# Patient Record
Sex: FEMALE | Race: BLACK OR AFRICAN AMERICAN | Employment: PART TIME | URBAN - METROPOLITAN AREA
[De-identification: names, ages, dates, MRNs, and addresses within clinical notes are randomized per-mention and may not be internally consistent; named-entity substitution may affect disease eponyms.]

---

## 2017-12-18 ENCOUNTER — HOSPITAL ENCOUNTER (EMERGENCY)
Facility: CLINIC | Age: 22
Discharge: HOME | End: 2017-12-18
Attending: EMERGENCY MEDICINE | Admitting: EMERGENCY MEDICINE

## 2017-12-18 ENCOUNTER — APPOINTMENT (OUTPATIENT)
Dept: ULTRASOUND IMAGING | Facility: CLINIC | Age: 22
End: 2017-12-18
Attending: PHYSICIAN ASSISTANT

## 2017-12-18 VITALS
WEIGHT: 150 LBS | TEMPERATURE: 98.2 F | RESPIRATION RATE: 20 BRPM | OXYGEN SATURATION: 100 % | HEIGHT: 64 IN | DIASTOLIC BLOOD PRESSURE: 61 MMHG | BODY MASS INDEX: 25.61 KG/M2 | SYSTOLIC BLOOD PRESSURE: 97 MMHG

## 2017-12-18 DIAGNOSIS — M54.50 ACUTE BILATERAL LOW BACK PAIN WITHOUT SCIATICA: ICD-10-CM

## 2017-12-18 DIAGNOSIS — R10.2 PELVIC PAIN IN FEMALE: ICD-10-CM

## 2017-12-18 LAB
ALBUMIN SERPL-MCNC: 3.4 G/DL (ref 3.4–5)
ALBUMIN UR-MCNC: NEGATIVE MG/DL
ALP SERPL-CCNC: 81 U/L (ref 40–150)
ALT SERPL W P-5'-P-CCNC: 14 U/L (ref 0–50)
ANION GAP SERPL CALCULATED.3IONS-SCNC: 9 MMOL/L (ref 3–14)
APPEARANCE UR: ABNORMAL
AST SERPL W P-5'-P-CCNC: 11 U/L (ref 0–45)
BACTERIA #/AREA URNS HPF: ABNORMAL /HPF
BASOPHILS # BLD AUTO: 0 10E9/L (ref 0–0.2)
BASOPHILS NFR BLD AUTO: 0.5 %
BILIRUB SERPL-MCNC: 0.3 MG/DL (ref 0.2–1.3)
BILIRUB UR QL STRIP: NEGATIVE
BUN SERPL-MCNC: 7 MG/DL (ref 7–30)
CALCIUM SERPL-MCNC: 8.8 MG/DL (ref 8.5–10.1)
CHLORIDE SERPL-SCNC: 106 MMOL/L (ref 94–109)
CO2 SERPL-SCNC: 22 MMOL/L (ref 20–32)
COLOR UR AUTO: YELLOW
CREAT SERPL-MCNC: 0.51 MG/DL (ref 0.52–1.04)
DIFFERENTIAL METHOD BLD: ABNORMAL
EOSINOPHIL # BLD AUTO: 0.2 10E9/L (ref 0–0.7)
EOSINOPHIL NFR BLD AUTO: 3 %
ERYTHROCYTE [DISTWIDTH] IN BLOOD BY AUTOMATED COUNT: 13.9 % (ref 10–15)
GFR SERPL CREATININE-BSD FRML MDRD: >90 ML/MIN/1.7M2
GLUCOSE SERPL-MCNC: 81 MG/DL (ref 70–99)
GLUCOSE UR STRIP-MCNC: NEGATIVE MG/DL
HCG UR QL: NEGATIVE
HCT VFR BLD AUTO: 36.3 % (ref 35–47)
HGB BLD-MCNC: 11.6 G/DL (ref 11.7–15.7)
HGB UR QL STRIP: ABNORMAL
IMM GRANULOCYTES # BLD: 0 10E9/L (ref 0–0.4)
IMM GRANULOCYTES NFR BLD: 0.2 %
KETONES UR STRIP-MCNC: NEGATIVE MG/DL
LEUKOCYTE ESTERASE UR QL STRIP: ABNORMAL
LYMPHOCYTES # BLD AUTO: 2.2 10E9/L (ref 0.8–5.3)
LYMPHOCYTES NFR BLD AUTO: 33 %
MCH RBC QN AUTO: 25.9 PG (ref 26.5–33)
MCHC RBC AUTO-ENTMCNC: 32 G/DL (ref 31.5–36.5)
MCV RBC AUTO: 81 FL (ref 78–100)
MONOCYTES # BLD AUTO: 0.8 10E9/L (ref 0–1.3)
MONOCYTES NFR BLD AUTO: 12.6 %
MUCOUS THREADS #/AREA URNS LPF: PRESENT /LPF
NEUTROPHILS # BLD AUTO: 3.3 10E9/L (ref 1.6–8.3)
NEUTROPHILS NFR BLD AUTO: 50.7 %
NITRATE UR QL: NEGATIVE
NRBC # BLD AUTO: 0 10*3/UL
NRBC BLD AUTO-RTO: 0 /100
PH UR STRIP: 5 PH (ref 5–7)
PLATELET # BLD AUTO: 295 10E9/L (ref 150–450)
POTASSIUM SERPL-SCNC: 3.5 MMOL/L (ref 3.4–5.3)
PROT SERPL-MCNC: 8.2 G/DL (ref 6.8–8.8)
RBC # BLD AUTO: 4.48 10E12/L (ref 3.8–5.2)
RBC #/AREA URNS AUTO: 6 /HPF (ref 0–2)
SODIUM SERPL-SCNC: 137 MMOL/L (ref 133–144)
SOURCE: ABNORMAL
SP GR UR STRIP: 1.02 (ref 1–1.03)
SPECIMEN SOURCE: NORMAL
SPERM #/AREA URNS HPF: PRESENT /HPF
SQUAMOUS #/AREA URNS AUTO: 26 /HPF (ref 0–1)
UROBILINOGEN UR STRIP-MCNC: 0 MG/DL (ref 0–2)
WBC # BLD AUTO: 6.6 10E9/L (ref 4–11)
WBC #/AREA URNS AUTO: 23 /HPF (ref 0–2)
WET PREP SPEC: NORMAL

## 2017-12-18 PROCEDURE — 87591 N.GONORRHOEAE DNA AMP PROB: CPT | Performed by: PHYSICIAN ASSISTANT

## 2017-12-18 PROCEDURE — 99285 EMERGENCY DEPT VISIT HI MDM: CPT | Mod: 25

## 2017-12-18 PROCEDURE — 87086 URINE CULTURE/COLONY COUNT: CPT | Performed by: EMERGENCY MEDICINE

## 2017-12-18 PROCEDURE — 96374 THER/PROPH/DIAG INJ IV PUSH: CPT

## 2017-12-18 PROCEDURE — 81001 URINALYSIS AUTO W/SCOPE: CPT | Performed by: EMERGENCY MEDICINE

## 2017-12-18 PROCEDURE — 87210 SMEAR WET MOUNT SALINE/INK: CPT | Performed by: PHYSICIAN ASSISTANT

## 2017-12-18 PROCEDURE — 25000128 H RX IP 250 OP 636: Performed by: PHYSICIAN ASSISTANT

## 2017-12-18 PROCEDURE — 80053 COMPREHEN METABOLIC PANEL: CPT | Performed by: EMERGENCY MEDICINE

## 2017-12-18 PROCEDURE — 93976 VASCULAR STUDY: CPT | Mod: XS

## 2017-12-18 PROCEDURE — 87491 CHLMYD TRACH DNA AMP PROBE: CPT | Performed by: PHYSICIAN ASSISTANT

## 2017-12-18 PROCEDURE — 85025 COMPLETE CBC W/AUTO DIFF WBC: CPT | Performed by: EMERGENCY MEDICINE

## 2017-12-18 PROCEDURE — 81025 URINE PREGNANCY TEST: CPT | Performed by: EMERGENCY MEDICINE

## 2017-12-18 RX ORDER — CYCLOBENZAPRINE HCL 5 MG
5 TABLET ORAL 3 TIMES DAILY PRN
Qty: 15 TABLET | Refills: 0 | Status: SHIPPED | OUTPATIENT
Start: 2017-12-18 | End: 2018-01-29

## 2017-12-18 RX ORDER — IBUPROFEN 600 MG/1
600 TABLET, FILM COATED ORAL EVERY 6 HOURS PRN
Qty: 30 TABLET | Refills: 0 | Status: SHIPPED | OUTPATIENT
Start: 2017-12-18 | End: 2018-01-29

## 2017-12-18 RX ORDER — KETOROLAC TROMETHAMINE 30 MG/ML
30 INJECTION, SOLUTION INTRAMUSCULAR; INTRAVENOUS ONCE
Status: COMPLETED | OUTPATIENT
Start: 2017-12-18 | End: 2017-12-18

## 2017-12-18 RX ADMIN — KETOROLAC TROMETHAMINE 30 MG: 30 INJECTION, SOLUTION INTRAMUSCULAR at 18:51

## 2017-12-18 ASSESSMENT — ENCOUNTER SYMPTOMS
ABDOMINAL PAIN: 1
VOMITING: 1
FEVER: 1
BACK PAIN: 1

## 2017-12-18 NOTE — ED PROVIDER NOTES
"  History     Chief Complaint:  Abdominal Pain and Back Pain      HPI   History is provided by patient's partner.     Yoana Henley is a 32 year old female who presents with back and abdominal pain. Patient reports that she has been experiencing back pain for about a month with intermittent lower abdominal pain. According to the partner, patient complains of the pain mostly felt during sex. She has also been experiencing episodes of vomiting and \"feeling warm\" at night. Patient went to Urgent care for evaluation of these symptoms and was referred here after her urine returned with traces of blood in it. She denies any vaginal discharge or itching. She is not currently menstruating. She reports that ibuprofen helps a little. The patient has no prior history of kidney stones.     Allergies:  No known drug allergies.     Medications:    The patient is currently on no regular medications.     Past Medical History:    History reviewed.  No significant past medical history.      Past Surgical History:    History reviewed. No pertinent past surgical history.     Family History:    History reviewed. No pertinent family history.     Social History:  Marital Status:  Single   Smoking status: No   Alcohol use: No      Review of Systems   Constitutional: Positive for fever.   Gastrointestinal: Positive for abdominal pain and vomiting.   Genitourinary: Negative for vaginal discharge.   Musculoskeletal: Positive for back pain.   All other systems reviewed and are negative.    Physical Exam   First Vitals:  BP: 122/72  Heart Rate: 74  Temp: 98.2  F (36.8  C)  Resp: 20  Height: 162.6 cm (5' 4\")  Weight: 68 kg (150 lb)  SpO2: 99 %      Physical Exam  Nursing note and vitals reviewed.     GENERAL: Alert, mild distress, non toxic appearing.   HEENT: Normal conjunctiva. No scleral icterus. MMM.   NECK: Supple.  CARDIAC: Normal rate and regular rhythm. Normal heart sounds. No murmurs, rubs, or gallops appreciated. Intact distal pedal " pulses 2+.   PULMONARY: CTA bilaterally. Normal breath sounds. No wheezing, crackles, or rhonchi appreciated.  ABDOMEN: Soft, non distended abdomen. Non-tender. No rebound or guarding.   : Normal external genitalia. Scant clear discharge in vaginal vault. No blood. No CMT. No bilateral adnexal tenderness bilaterally.   NEURO: Alert and oriented. Non-focal. Sensation intact to light touch distally in bilateral lower extremities. Normal and symmetric strength of bilateral lower extremities. Active DF/PF intact.   MUSCULOSKELETAL: Normal range of motion. No peripheral edema. No CVA tenderness. Mild tenderness over bilateral lumbar paraspinal musculature. No midline tenderness over thoracic or lumbar spine.   SKIN: Skin is warm and dry. No rashes. No pallor or jaundice.   PSYCH: Normal affect and mood.       Emergency Department Course   Imaging:  Pelvic US Complete with Transvaginal and Abd/Pel Duplex Limited:   IMPRESSION:  No specific acute abnormality is seen.  Per radiology.      Laboratory:  CBC: HGB 11.6 (L) MCH 25.9 (L) otherwise within normal limits   CMP: Creatinine  0.51 (L) otherwise within normal limits   UA: Urine Blood Small (A) Leukocyte Esterase Urine Moderate (A) WBC 23 (H) RBC 6 (H) Bacteria Few (A) Squamous Epithelial Urine Moderate (A) Mucous Urine Present  (A) Sperm Present (A)  Urine Culture: pending   HCG Qualitative Urine Negative  Chlamydia Trachomatis PCR: in process   Neisseria Gonorrhea PCR: in process   Wet Prep: Negative    Interventions:  1851 Toradol, 30 mg, IV injection     Emergency Department Course:  Past medical records, nursing notes, and vitals reviewed.  1654: I performed an exam of the patient and obtained history, as documented above.     IV inserted and blood drawn for the above work up to be conducted, and to administer the above intervbentions.     I discussed plan of care with the patient and  who were in agreement. They expressed understanding of the discharge  instructions, questions were answered, written instructions provided, and patient discharged in satisfactory condition.      Impression & Plan      Medical Decision Making:  This is a 32 year old female who presents from urgent care for evaluation of a month of lower back discomfort and then intermittent lower abdominal/pelvic pain. She did have trace blood in the urine and was sent here for further evaluation. Here, she is afebrile, hemodynamically stable and non toxic appearing. She had minimal suprapubic tenderness on exam, no peritoneal signs. Pelvic US was negative for ovarian torsion, cysts, free fluid in the pelvis, or any other new concerns. She is not pregnant. No concern for STDs and no CMT and no copious discharge concerning for pelvic inflammatory disease. GC cultures pending. She does have small amount of blood and few bacteria/white cells in urine; though this is significantly contaminated thus I have low suspicion this represents a true UTI and she is not having any active urinary symptoms. In regards to the back pain, seems musculoskeletal in nature. No trauma thus no indication for xrays due to low likelihood of acute fracture or subluxation. She is neurovascularly intact on exam. No clinical evidence of cauda equina, epidural abscess, spinal hematoma, acute cord compression or any other more worrisome etiologies. I considered kidney stone, though given this has been ongoing for a month, she is not writhing in pain, I have very low suspicion for this. She had some improvement following above interventions. Given this, I feel she is safe for discharge home. Advised close follow up with PCP particularly for the dyspareunia. For the back pain, advised ice, rest, avoid heavy lifting, and ibuprofen for pain. Also provided a prescription for muscle relaxer to use as needed. Reviewed reasons to return to ED, including worsening symptoms or any new concerns. The patient was in agreement with plan and  discharged in satisfactory condition with all questions answered.       Diagnosis:  (R10.2) Pelvic pain in female    (M54.5) Acute bilateral low back pain without sciatica     Disposition:  Discharge to home with PCP follow up    Discharge Medications:  Kamar Garvin am serving as a scribe at 4:54 PM on 12/18/2017 to document services personally performed by Dyan Sutton PA-C based on my observations and the provider's statements to me.   [facility] EMERGENCY DEPARTMENT       Dyan Sutton PA-C  12/21/17 0339

## 2017-12-18 NOTE — ED AVS SNAPSHOT
[facility] Emergency Department    [address]    [facility] [address]    Phone:  [phone]    Fax:  [phone]                                       Yoana Henley   MRN: [id number]    Department:  [facility] Emergency Department   Date of Visit:  12/18/2017           Patient Information     Date Of Birth          1995        Your diagnoses for this visit were:     Pelvic pain in female     Acute bilateral low back pain without sciatica        You were seen by Ashkan Crowe DO and Dyan Sutton PA-C.      Follow-up Information     Follow up with [facility] Emergency Department.    Specialty:  EMERGENCY MEDICINE    Why:  If symptoms worsen    Contact information:    [address]  [facility] [address] [phone]        Follow up with Corona Cota MD In 3 days.    Specialty:  OB/Gyn    Contact information:    [address] [facility] [address]  [phone]          Discharge Instructions       Rest, ibuprofen for pain, flexeril for more severe pain (avoid driving or drinking alcohol with this medication).   Follow up with primary care or OB GYN for recheck to ensure improving in regards to pelvic pain.   Have them recheck urine to ensure blood has resolved.   Return if worsening pain, high fevers, vomiting or any other new concerns.     Discharge Instructions  Back Pain  You were seen today for back pain. Back pain can have many causes, but most will get better without surgery or other specific treatment. Sometimes there is a herniated ( slipped ) disc. We do not usually do MRI scans to look for these right away, since most herniated discs will get better on their own with time.  Today, we did not find any evidence that your back pain was caused by a serious condition. However, sometimes symptoms develop over time and cannot be found during an emergency visit, so it is very important that you follow up  with your primary provider.  Generally, every Emergency Department visit should have a follow-up clinic visit with either a primary or a specialty clinic/provider. Please follow-up as instructed by your emergency provider today.    Return to the Emergency Department if:    You develop a fever with your back pain.     You have weakness or change in sensation in one or both legs.    You lose control of your bowels or bladder, or cannot empty your bladder (cannot pee).    Your pain gets much worse.     Follow-up with your provider:    Unless your pain has completely gone away, please make an appointment with your provider within one week. Most of the routine care for back pain is available in a clinic and not the Emergency Department. You may need further management of your back pain, such as more pain medication, imaging such as an X-ray or MRI, or physical therapy.    What can I do to help myself?    Remain Active -- People are often afraid that they will hurt their back further or delay recovery by remaining active, but this is one of the best things you can do for your back. In fact, staying in bed for a long time to rest is not recommended. Studies have shown that people with low back pain recover faster when they remain active. Movement helps to bring blood flow to the muscles and relieve muscle spasms as well as preventing loss of muscle strength.    Heat -- Using a heating pad can help with low back pain during the first few weeks. Do not sleep with a heating pad, as you can be burned.     Pain medications - You may take a pain medication such as Tylenol  (acetaminophen), Advil , Motrin  (ibuprofen) or Aleve  (naproxen).  If you were given a prescription for medicine here today, be sure to read all of the information (including the package insert) that comes with your prescription.  This will include important information about the medicine, its side effects, and any warnings that you need to know about.  The  pharmacist who fills the prescription can provide more information and answer questions you may have about the medicine.  If you have questions or concerns that the pharmacist cannot address, please call or return to the Emergency Department.   Remember that you can always come back to the Emergency Department if you are not able to see your regular provider in the amount of time listed above, if you get any new symptoms, or if there is anything that worries you.      Discharge References/Attachments     PELVIC PAIN, UNKNOWN CAUSE (ENGLISH)      24 Hour Appointment Hotline       To make an appointment at any [facility], call [phone] ([phone]). If you don't have a family doctor or clinic, we will help you find one. [place] clinics are conveniently located to serve the needs of you and your family.             Review of your medicines      START taking        Dose / Directions Last dose taken    cyclobenzaprine 5 MG tablet   Commonly known as:  FLEXERIL   Dose:  5 mg   Quantity:  15 tablet        Take 1 tablet (5 mg) by mouth 3 times daily as needed for muscle spasms   Refills:  0          CONTINUE these medicines which may have CHANGED, or have new prescriptions. If we are uncertain of the size of tablets/capsules you have at home, strength may be listed as something that might have changed.        Dose / Directions Last dose taken    ibuprofen 600 MG tablet   Commonly known as:  ADVIL/MOTRIN   Dose:  600 mg   What changed:    - medication strength  - how much to take  - when to take this  - reasons to take this   Quantity:  30 tablet        Take 1 tablet (600 mg) by mouth every 6 hours as needed for moderate pain   Refills:  0                Prescriptions were sent or printed at these locations (2 Prescriptions)                   Other Prescriptions                Printed at Department/Unit printer (2 of 2)         cyclobenzaprine (FLEXERIL) 5 MG tablet               ibuprofen (ADVIL/MOTRIN) 600  MG tablet                Procedures and tests performed during your visit     CBC with platelets differential    Chlamydia trachomatis PCR    Comprehensive metabolic panel    HCG qualitative urine (UPT)    Neisseria gonorrhoeae PCR    UA with Microscopic    US Pelvic Complete w Transvaginal & Abd/Pel Duplex Limited    Urine Culture    Wet prep      Orders Needing Specimen Collection     None      Pending Results     Date and Time Order Name Status Description    12/18/2017 1654 Neisseria gonorrhoeae PCR In process     12/18/2017 1654 Chlamydia trachomatis PCR In process     12/18/2017 1654 US Pelvic Complete w Transvaginal & Abd/Pel Duplex Limited Preliminary     12/18/2017 1631 Urine Culture In process             Pending Culture Results     Date and Time Order Name Status Description    12/18/2017 1654 Neisseria gonorrhoeae PCR In process     12/18/2017 1654 Chlamydia trachomatis PCR In process     12/18/2017 1631 Urine Culture In process             Pending Results Instructions     If you had any lab results that were not finalized at the time of your Discharge, you can call the ED Lab Result RN at [phone]. You will be contacted by this team for any positive Lab results or changes in treatment. The nurses are available 7 days a week from 10A to 6:30P.  You can leave a message 24 hours per day and they will return your call.        Test Results From Your Hospital Stay        12/18/2017  5:39 PM      Component Results     Component Value Ref Range & Units Status    WBC 6.6 4.0 - 11.0 10e9/L Final    RBC Count 4.48 3.8 - 5.2 10e12/L Final    Hemoglobin 11.6 (L) 11.7 - 15.7 g/dL Final    Hematocrit 36.3 35.0 - 47.0 % Final    MCV 81 78 - 100 fl Final    MCH 25.9 (L) 26.5 - 33.0 pg Final    MCHC 32.0 31.5 - 36.5 g/dL Final    RDW 13.9 10.0 - 15.0 % Final    Platelet Count 295 150 - 450 10e9/L Final    Diff Method Automated Method  Final    % Neutrophils 50.7 % Final    % Lymphocytes 33.0 % Final    % Monocytes  12.6 % Final    % Eosinophils 3.0 % Final    % Basophils 0.5 % Final    % Immature Granulocytes 0.2 % Final    Nucleated RBCs 0 0 /100 Final    Absolute Neutrophil 3.3 1.6 - 8.3 10e9/L Final    Absolute Lymphocytes 2.2 0.8 - 5.3 10e9/L Final    Absolute Monocytes 0.8 0.0 - 1.3 10e9/L Final    Absolute Eosinophils 0.2 0.0 - 0.7 10e9/L Final    Absolute Basophils 0.0 0.0 - 0.2 10e9/L Final    Abs Immature Granulocytes 0.0 0 - 0.4 10e9/L Final    Absolute Nucleated RBC 0.0  Final         12/18/2017  4:56 PM      Component Results     Component Value Ref Range & Units Status    Color Urine Yellow  Final    Appearance Urine Cloudy  Final    Glucose Urine Negative NEG^Negative mg/dL Final    Bilirubin Urine Negative NEG^Negative Final    Ketones Urine Negative NEG^Negative mg/dL Final    Specific Gravity Urine 1.025 1.003 - 1.035 Final    Blood Urine Small (A) NEG^Negative Final    pH Urine 5.0 5.0 - 7.0 pH Final    Protein Albumin Urine Negative NEG^Negative mg/dL Final    Urobilinogen mg/dL 0.0 0.0 - 2.0 mg/dL Final    Nitrite Urine Negative NEG^Negative Final    Leukocyte Esterase Urine Moderate (A) NEG^Negative Final    Source Midstream Urine  Final    WBC Urine 23 (H) 0 - 2 /HPF Final    RBC Urine 6 (H) 0 - 2 /HPF Final    Bacteria Urine Few (A) NEG^Negative /HPF Final    Squamous Epithelial /HPF Urine 26 (H) 0 - 1 /HPF Final    Mucous Urine Present (A) NEG^Negative /LPF Final    sperm Present (A) NEG^Negative /HPF Final         12/18/2017  4:46 PM         12/18/2017  4:56 PM      Component Results     Component Value Ref Range & Units Status    HCG Qual Urine Negative NEG^Negative Final    This test is for screening purposes.  Results should be interpreted along with   the clinical picture.  Confirmation testing is available if warranted by   ordering [id number], HCG Quantitative Pregnancy.           12/18/2017  5:54 PM      Component Results     Component Value Ref Range & Units Status    Sodium 137 133 - 144 mmol/L  Final    Potassium 3.5 3.4 - 5.3 mmol/L Final    Chloride 106 94 - 109 mmol/L Final    Carbon Dioxide 22 20 - 32 mmol/L Final    Anion Gap 9 3 - 14 mmol/L Final    Glucose 81 70 - 99 mg/dL Final    Urea Nitrogen 7 7 - 30 mg/dL Final    Creatinine 0.51 (L) 0.52 - 1.04 mg/dL Final    GFR Estimate >90 >60 mL/min/1.7m2 Final    Non  GFR Calc    GFR Estimate If Black >90 >60 mL/min/1.7m2 Final    African American GFR Calc    Calcium 8.8 8.5 - 10.1 mg/dL Final    Bilirubin Total 0.3 0.2 - 1.3 mg/dL Final    Albumin 3.4 3.4 - 5.0 g/dL Final    Protein Total 8.2 6.8 - 8.8 g/dL Final    Alkaline Phosphatase 81 40 - 150 U/L Final    ALT 14 0 - 50 U/L Final    AST 11 0 - 45 U/L Final         12/18/2017  7:57 PM      Narrative     PELVIC ULTRASOUND WITH ENDOVAGINAL TRANSDUCER    12/18/2017 7:56 PM     HISTORY: Lower abdominal/pelvic pain.     TECHNIQUE:  Endovaginal sonography was added to the transabdominal  exam to better evaluate the uterus and ovaries.    COMPARISON: None.    FINDINGS: Endometrium is 7 mm thick. Uterus measures 7.8 x 2.8 x 4.2  cm. Right and left ovaries appear normal. Color Doppler spectral  analysis of the right and left ovaries show normal perfusion.        Impression     IMPRESSION:  No specific acute abnormality is seen.            12/18/2017  7:06 PM      Component Results     Component    Specimen Description    Vagina    Wet Prep    No cells seen    Wet Prep    No Trichomonas seen    Wet Prep    No yeast seen    Wet Prep    No WBC's seen         12/18/2017  7:02 PM         12/18/2017  7:02 PM                Clinical Quality Measure: Blood Pressure Screening     Your blood pressure was checked while you were in the emergency department today. The last reading we obtained was  BP: 108/73 . Please read the guidelines below about what these numbers mean and what you should do about them.  If your systolic blood pressure (the top number) is less than 120 and your diastolic blood pressure  "(the bottom number) is less than 80, then your blood pressure is normal. There is nothing more that you need to do about it.  If your systolic blood pressure (the top number) is 120-139 or your diastolic blood pressure (the bottom number) is 80-89, your blood pressure may be higher than it should be. You should have your blood pressure rechecked within a year by a primary care provider.  If your systolic blood pressure (the top number) is 140 or greater or your diastolic blood pressure (the bottom number) is 90 or greater, you may have high blood pressure. High blood pressure is treatable, but if left untreated over time it can put you at risk for heart attack, stroke, or kidney failure. You should have your blood pressure rechecked by a primary care provider within the next 4 weeks.  If your provider in the emergency department today gave you specific instructions to follow-up with your doctor or provider even sooner than that, you should follow that instruction and not wait for up to 4 weeks for your follow-up visit.        Thank you for choosing [facility]       Thank you for choosing [facility] for your care. Our goal is always to provide you with excellent care. Hearing back from our patients is one way we can continue to improve our services. Please take a few minutes to complete the written survey that you may receive in the mail after you visit with us. Thank you!        [email or web address] Information     [employer] lets you send messages to your doctor, view your test results, renew your prescriptions, schedule appointments and more. To sign up, go to [email or web address] . Click on \"Log in\" on the left side of the screen, which will take you to the Welcome page. Then click on \"Sign up Now\" on the right side of the page.     You will be asked to enter the access code listed below, as well as some personal information. Please follow the directions to create your username and password.     Your access code is: " [email or web address]  Expires: 3/18/2018  8:06 PM     Your access code will  in 90 days. If you need help or a new code, please call your [place] clinic or [phone].        Care EveryWhere ID     This is your Care EveryWhere ID. This could be used by other organizations to access your [place] medical records  [id number]        Equal Access to Services     [facility] ENZO : Hadii grisel garciao Sotamara, waaxda luqadaha, qaybta kaalmada adeegyada, destin dobbins . So [facility] [phone].    ATENCIÓN: Si habla español, tiene a rollins disposición servicios gratuitos de asistencia lingüística. Llame al [phone].    We comply with applicable federal civil rights laws and [place] laws. We do not discriminate on the basis of race, color, national origin, age, disability, sex, sexual orientation, or gender identity.            After Visit Summary       This is your record. Keep this with you and show to your community pharmacist(s) and doctor(s) at your next visit.

## 2017-12-19 LAB
BACTERIA SPEC CULT: NORMAL
C TRACH DNA SPEC QL NAA+PROBE: NEGATIVE
Lab: NORMAL
N GONORRHOEA DNA SPEC QL NAA+PROBE: NEGATIVE
SPECIMEN SOURCE: NORMAL

## 2017-12-19 NOTE — DISCHARGE INSTRUCTIONS
Rest, ibuprofen for pain, flexeril for more severe pain (avoid driving or drinking alcohol with this medication).   Follow up with primary care or OB GYN for recheck to ensure improving in regards to pelvic pain.   Have them recheck urine to ensure blood has resolved.   Return if worsening pain, high fevers, vomiting or any other new concerns.     Discharge Instructions  Back Pain  You were seen today for back pain. Back pain can have many causes, but most will get better without surgery or other specific treatment. Sometimes there is a herniated ( slipped ) disc. We do not usually do MRI scans to look for these right away, since most herniated discs will get better on their own with time.  Today, we did not find any evidence that your back pain was caused by a serious condition. However, sometimes symptoms develop over time and cannot be found during an emergency visit, so it is very important that you follow up with your primary provider.  Generally, every Emergency Department visit should have a follow-up clinic visit with either a primary or a specialty clinic/provider. Please follow-up as instructed by your emergency provider today.    Return to the Emergency Department if:    You develop a fever with your back pain.     You have weakness or change in sensation in one or both legs.    You lose control of your bowels or bladder, or cannot empty your bladder (cannot pee).    Your pain gets much worse.     Follow-up with your provider:    Unless your pain has completely gone away, please make an appointment with your provider within one week. Most of the routine care for back pain is available in a clinic and not the Emergency Department. You may need further management of your back pain, such as more pain medication, imaging such as an X-ray or MRI, or physical therapy.    What can I do to help myself?    Remain Active -- People are often afraid that they will hurt their back further or delay recovery by  remaining active, but this is one of the best things you can do for your back. In fact, staying in bed for a long time to rest is not recommended. Studies have shown that people with low back pain recover faster when they remain active. Movement helps to bring blood flow to the muscles and relieve muscle spasms as well as preventing loss of muscle strength.    Heat -- Using a heating pad can help with low back pain during the first few weeks. Do not sleep with a heating pad, as you can be burned.     Pain medications - You may take a pain medication such as Tylenol  (acetaminophen), Advil , Motrin  (ibuprofen) or Aleve  (naproxen).  If you were given a prescription for medicine here today, be sure to read all of the information (including the package insert) that comes with your prescription.  This will include important information about the medicine, its side effects, and any warnings that you need to know about.  The pharmacist who fills the prescription can provide more information and answer questions you may have about the medicine.  If you have questions or concerns that the pharmacist cannot address, please call or return to the Emergency Department.   Remember that you can always come back to the Emergency Department if you are not able to see your regular provider in the amount of time listed above, if you get any new symptoms, or if there is anything that worries you.

## 2018-01-08 ENCOUNTER — OFFICE VISIT (OUTPATIENT)
Dept: INTERNAL MEDICINE | Facility: CLINIC | Age: 23
End: 2018-01-08
Payer: COMMERCIAL

## 2018-01-08 VITALS
HEIGHT: 65 IN | DIASTOLIC BLOOD PRESSURE: 78 MMHG | OXYGEN SATURATION: 99 % | WEIGHT: 149.1 LBS | TEMPERATURE: 99 F | HEART RATE: 102 BPM | SYSTOLIC BLOOD PRESSURE: 110 MMHG | BODY MASS INDEX: 24.84 KG/M2

## 2018-01-08 DIAGNOSIS — R10.84 DIFFUSE ABDOMINAL PAIN: Primary | ICD-10-CM

## 2018-01-08 DIAGNOSIS — R82.90 NONSPECIFIC FINDING ON EXAMINATION OF URINE: ICD-10-CM

## 2018-01-08 LAB
ALBUMIN UR-MCNC: NEGATIVE MG/DL
APPEARANCE UR: CLEAR
BACTERIA #/AREA URNS HPF: ABNORMAL /HPF
BETA HCG QUAL IFA URINE: NEGATIVE
BILIRUB UR QL STRIP: NEGATIVE
COLOR UR AUTO: YELLOW
GLUCOSE UR STRIP-MCNC: NEGATIVE MG/DL
HGB UR QL STRIP: ABNORMAL
KETONES UR STRIP-MCNC: NEGATIVE MG/DL
LEUKOCYTE ESTERASE UR QL STRIP: ABNORMAL
NITRATE UR QL: NEGATIVE
NON-SQ EPI CELLS #/AREA URNS LPF: ABNORMAL /LPF
PH UR STRIP: 5.5 PH (ref 5–7)
RBC #/AREA URNS AUTO: ABNORMAL /HPF
SOURCE: ABNORMAL
SP GR UR STRIP: 1.02 (ref 1–1.03)
UROBILINOGEN UR STRIP-ACNC: 0.2 EU/DL (ref 0.2–1)
WBC #/AREA URNS AUTO: ABNORMAL /HPF

## 2018-01-08 PROCEDURE — 99214 OFFICE O/P EST MOD 30 MIN: CPT | Performed by: INTERNAL MEDICINE

## 2018-01-08 PROCEDURE — 80053 COMPREHEN METABOLIC PANEL: CPT | Performed by: INTERNAL MEDICINE

## 2018-01-08 PROCEDURE — 87086 URINE CULTURE/COLONY COUNT: CPT | Performed by: INTERNAL MEDICINE

## 2018-01-08 PROCEDURE — 87591 N.GONORRHOEAE DNA AMP PROB: CPT | Performed by: INTERNAL MEDICINE

## 2018-01-08 PROCEDURE — 81001 URINALYSIS AUTO W/SCOPE: CPT | Performed by: INTERNAL MEDICINE

## 2018-01-08 PROCEDURE — 84703 CHORIONIC GONADOTROPIN ASSAY: CPT | Performed by: INTERNAL MEDICINE

## 2018-01-08 PROCEDURE — 36415 COLL VENOUS BLD VENIPUNCTURE: CPT | Performed by: INTERNAL MEDICINE

## 2018-01-08 PROCEDURE — 87491 CHLMYD TRACH DNA AMP PROBE: CPT | Performed by: INTERNAL MEDICINE

## 2018-01-08 NOTE — NURSING NOTE
"Chief Complaint   Patient presents with     RECHECK     Follow up on ED [facility] 12/18/17 for abdominal pain. Still not feeling any better.       Initial /78 (BP Location: Right arm, Patient Position: Sitting, Cuff Size: Adult Regular)  Pulse 102  Temp 99  F (37.2  C) (Oral)  Ht 5' 5\" (1.651 m)  Wt 149 lb 1.6 oz (67.6 kg)  LMP 12/19/2017  SpO2 99%  BMI 24.81 kg/m2 Estimated body mass index is 24.81 kg/(m^2) as calculated from the following:    Height as of this encounter: 5' 5\" (1.651 m).    Weight as of this encounter: 149 lb 1.6 oz (67.6 kg).  Medication Reconciliation: complete   Anh Prescott MA      "

## 2018-01-08 NOTE — PATIENT INSTRUCTIONS
What Is Prenatal Care?  Before becoming pregnant, you may have adopted good health habits to prepare for your baby. But if you didn t, start today. One of the first steps is learning how to take care of yourself. See your healthcare provider as soon as you think you may be pregnant. Then, continue prenatal care throughout your pregnancy.    Prenatal care helps you have a healthy baby  During prenatal care:    Your healthcare provider evaluates the health of your pregnancy. Your healthcare provider will calculate a  due date  which gives an estimate of the delivery of your baby. Many women give birth between 38 and 41 weeks of pregnancy. Your due date is determined by counting 40 weeks from the first day of your last menstrual period.    The progress of your pregnancy is checked. This includes your baby s growth, fetal heart rate, changes in your weight and blood pressure, and your overall health and comfort.    Your healthcare provider may find new concerns and manage existing ones before problems happen.    Your healthcare provider will check lab work through blood and urine.  You are part of a team  When you re pregnant, you re part of a team that includes you, your baby, and your healthcare provider. Your team also may include a partner or a main support person. He or she could be a loved one, like a spouse, a family member, or a friend. As you work toward giving your baby a healthy start, rely on your team members for support.  It s not too late to start good habits  What matters most is protecting your baby from this moment on. If you smoke, drink alcohol, or use drugs, now is the time to stop. If you need help, talk with your healthcare provider:    Smoking increases the risk of losing your baby or having a low-birth-weight baby. If you smoke, quit now.    Alcohol and drugs have been linked with miscarriage, birth defects, intellectual disability, and low birth weight. Avoid alcohol and drugs.    Eat a  healthy diet. This helps keep you and your baby strong and healthy. Follow your healthcare provider's instructions for nutrition. Also stay within the guidelines you are given for healthy weight gain.    Take 400 micrograms to 800 micrograms (400 mcg to 800 mcg or 0.4 mg to 0.8 mg) of folic acid every day for at least 3 months before getting pregnant to lower your risk of some birth defects of the brain and spine. You can get folic acid from some foods. It is hard to get all of the folic acid you will need from foods alone. Talk with your healthcare provider about taking a folic acid supplement.    Regular exercise will help you stay fit and feel good during pregnancy. It can also help prevent or minimize back pain. Be sure to talk with your healthcare provider about how to exercise safely during pregnancy.  Date Last Reviewed: 8/16/2015 2000-2017 [employer]. [address] [place] [address], [place] [address]. All rights reserved. This information is not intended as a substitute for professional medical care. Always follow your healthcare professional's instructions.

## 2018-01-08 NOTE — PROGRESS NOTES
SUBJECTIVE:   Yoana Henley is a 23 year old female who presents to clinic today for the following health issues:        ED/UC Followup:    Facility:  [facility]  Date of visit: 12/18/17  Reason for visit: Abdominal pain and back pain  Current Status: Still not feeling any better.         Abdominal Pain      Duration: 2 weeks    Description (location/character/radiation): Diffuse, dull ache.  Worse with eating       Associated flank pain: None    Intensity:  moderate    Accompanying signs and symptoms:        Fever/Chills: no        Gas/Bloating: YES       Nausea/vomitting: YES       Diarrhea: YES       Dysuria or Hematuria: no     History (previous similar pain/trauma/previous testing): No    Precipitating or alleviating factors:       Pain worse with eating/BM/urination: Yes worse with eating       Pain relieved by BM: no     Therapies tried and outcome: None    LMP:  12/19/17    Of note, patient also reports being treated for h.pylori.     Problem list and histories reviewed & adjusted, as indicated.  Additional history: as documented    There is no problem list on file for this patient.    History reviewed. No pertinent surgical history.    Social History   Substance Use Topics     Smoking status: Never Smoker     Smokeless tobacco: Never Used     Alcohol use No     History reviewed. No pertinent family history.          Reviewed and updated as needed this visit by clinical staffTobacco  Allergies  Med Hx  Surg Hx  Fam Hx  Soc Hx      Reviewed and updated as needed this visit by Provider         ROS:  C: NEGATIVE for fever, chills, change in weight  I: NEGATIVE for worrisome rashes, moles or lesions  E/M: NEGATIVE for ear, mouth and throat problems  R: NEGATIVE for significant cough or SOB  CV: NEGATIVE for chest pain, palpitations or peripheral edema  GI: NEGATIVE for nausea, abdominal pain, heartburn, or change in bowel habits  : NEGATIVE for frequency, dysuria, or hematuria  M: NEGATIVE for significant  "arthralgias or myalgia  N: NEGATIVE for weakness, dizziness or paresthesias  P: NEGATIVE for changes in mood or affect    OBJECTIVE:     /78 (BP Location: Right arm, Patient Position: Sitting, Cuff Size: Adult Regular)  Pulse 102  Temp 99  F (37.2  C) (Oral)  Ht 5' 5\" (1.651 m)  Wt 149 lb 1.6 oz (67.6 kg)  LMP 12/19/2017  SpO2 99%  BMI 24.81 kg/m2  Body mass index is 24.81 kg/(m^2).  GENERAL: healthy, alert and no distress  EYES: Eyes grossly normal to inspection, PERRL and conjunctivae and sclerae normal  RESP: lungs clear to auscultation - no rales, rhonchi or wheezes  CV: regular rate and rhythm, normal S1 S2, no S3 or S4, no murmur, click or rub, no peripheral edema and peripheral pulses strong  ABDOMEN: ++tenderness to palpation of epigastrum.  Hyperactive BS  MS: no gross musculoskeletal defects noted, no edema  SKIN: no suspicious lesions or rashes  NEURO: Normal strength and tone, mentation intact and speech normal  PSYCH: mentation appears normal, affect normal/bright    Diagnostic Test Results:  No results found for this or any previous visit (from the past 24 hour(s)).    ASSESSMENT/PLAN:       (R10.84) Diffuse abdominal pain  (primary encounter diagnosis)  Comment:     Patient presents for now persistent diffuse abdominal pain.  LMP 12/19/17.  She was seen in UC and ED for this two weeks back.  They did a pelvic US which was negative for ovarian torsion, cysts, free fluid in pelvis.  She does state she was treated for H.pylori in past.  She is currently on prilosec 20mg daily.    I will check for H pylori antigen to check for recurrence.  In additional r/o UTI, check urine preg, and Chlamydia/Gonorrhoe tho my suspicion is lower for this. She was also tested with wet prep at that time which was negative. No vag discharge, less likely PID.     If above negative, will consider doubling her PPI dose and consideration of EGD in future.     Plan: *UA reflex to Microscopic and Culture ([facility]        "  and [facility] (except [place] and         [place]), Comprehensive metabolic panel, HCG         qualitative urine, Chlamydia trachomatis PCR,         Neisseria gonorrhoeae PCR, H Pylori antigen,         stool          I spent >30 mins with patient, >50% time discussing symptoms, differential diagnosis and treatment plan    Hailey Alba MD  [facility]

## 2018-01-08 NOTE — MR AVS SNAPSHOT
After Visit Summary   1/8/2018    Yoana Henley    MRN: [id number]           Patient Information     Date Of Birth          1995        Visit Information        Provider Department      1/8/2018 11:15 AM Hailey Alba MD [facility]        Today's Diagnoses     Diffuse abdominal pain    -  1      Care Instructions      What Is Prenatal Care?  Before becoming pregnant, you may have adopted good health habits to prepare for your baby. But if you didn t, start today. One of the first steps is learning how to take care of yourself. See your healthcare provider as soon as you think you may be pregnant. Then, continue prenatal care throughout your pregnancy.    Prenatal care helps you have a healthy baby  During prenatal care:    Your healthcare provider evaluates the health of your pregnancy. Your healthcare provider will calculate a  due date  which gives an estimate of the delivery of your baby. Many women give birth between 38 and 41 weeks of pregnancy. Your due date is determined by counting 40 weeks from the first day of your last menstrual period.    The progress of your pregnancy is checked. This includes your baby s growth, fetal heart rate, changes in your weight and blood pressure, and your overall health and comfort.    Your healthcare provider may find new concerns and manage existing ones before problems happen.    Your healthcare provider will check lab work through blood and urine.  You are part of a team  When you re pregnant, you re part of a team that includes you, your baby, and your healthcare provider. Your team also may include a partner or a main support person. He or she could be a loved one, like a spouse, a family member, or a friend. As you work toward giving your baby a healthy start, rely on your team members for support.  It s not too late to start good habits  What matters most is protecting your baby from this moment on. If you smoke, drink alcohol, or use  drugs, now is the time to stop. If you need help, talk with your healthcare provider:    Smoking increases the risk of losing your baby or having a low-birth-weight baby. If you smoke, quit now.    Alcohol and drugs have been linked with miscarriage, birth defects, intellectual disability, and low birth weight. Avoid alcohol and drugs.    Eat a healthy diet. This helps keep you and your baby strong and healthy. Follow your healthcare provider's instructions for nutrition. Also stay within the guidelines you are given for healthy weight gain.    Take 400 micrograms to 800 micrograms (400 mcg to 800 mcg or 0.4 mg to 0.8 mg) of folic acid every day for at least 3 months before getting pregnant to lower your risk of some birth defects of the brain and spine. You can get folic acid from some foods. It is hard to get all of the folic acid you will need from foods alone. Talk with your healthcare provider about taking a folic acid supplement.    Regular exercise will help you stay fit and feel good during pregnancy. It can also help prevent or minimize back pain. Be sure to talk with your healthcare provider about how to exercise safely during pregnancy.  Date Last Reviewed: 8/16/2015 2000-2017 [employer]. [address] [place] [address]. All rights reserved. This information is not intended as a substitute for professional medical care. Always follow your healthcare professional's instructions.              Follow-ups after your visit        Future tests that were ordered for you today     Open Future Orders        Priority Expected Expires Ordered    H Pylori antigen, stool Routine  2/7/2018 1/8/2018            Who to contact     If you have questions or need follow up information about today's clinic visit or your schedule please contact [facility] directly at [phone].  Normal or non-critical lab and imaging results will be communicated to you by MyChart, letter or phone  "within 4 business days after the clinic has received the results. If you do not hear from us within 7 days, please contact the clinic through [employer] or phone. If you have a critical or abnormal lab result, we will notify you by phone as soon as possible.  Submit refill requests through [employer] or call your pharmacy and they will forward the refill request to us. Please allow 3 business days for your refill to be completed.          Additional Information About Your Visit        [email or web address] Information     [employer] lets you send messages to your doctor, view your test results, renew your prescriptions, schedule appointments and more. To sign up, go to [email or web address] . Click on \"Log in\" on the left side of the screen, which will take you to the Welcome page. Then click on \"Sign up Now\" on the right side of the page.     You will be asked to enter the access code listed below, as well as some personal information. Please follow the directions to create your username and password.     Your access code is: [email or web address]  Expires: 3/18/2018  8:06 PM     Your access code will  in 90 days. If you need help or a new code, please call your [place] clinic or [phone].        Care EveryWhere ID     This is your Care EveryWhere ID. This could be used by other organizations to access your [facility] medical records  [id number]        Your Vitals Were     Pulse Temperature Height Last Period Pulse Oximetry BMI (Body Mass Index)    102 99  F (37.2  C) (Oral) 5' 5\" (1.651 m) 2017 99% 24.81 kg/m2       Blood Pressure from Last 3 Encounters:   18 110/78   17 97/61    Weight from Last 3 Encounters:   18 149 lb 1.6 oz (67.6 kg)   17 150 lb (68 kg)              We Performed the Following     *UA reflex to Microscopic and Culture ([place] and [facility] (except [place] and [place])     Chlamydia trachomatis PCR     Comprehensive metabolic panel     HCG qualitative urine     Neisseria " gonorrhoeae PCR        Primary Care Provider Fax #    Physician No Ref-Primary [phone]       No address on file        Equal Access to Services     DEISY GIRALDO : Hadii aad ku hadlola Cedeño, tati carmensumanth, dorina freeman, destin rodríguezjuan peacock. So [facility] [phone].    ATENCIÓN: Si habla español, tiene a rollins disposición servicios gratuitos de asistencia lingüística. Llame al [phone].    We comply with applicable federal civil rights laws and [place] laws. We do not discriminate on the basis of race, color, national origin, age, disability, sex, sexual orientation, or gender identity.            Thank you!     Thank you for choosing [facility]  for your care. Our goal is always to provide you with excellent care. Hearing back from our patients is one way we can continue to improve our services. Please take a few minutes to complete the written survey that you may receive in the mail after your visit with us. Thank you!             Your Updated Medication List - Protect others around you: Learn how to safely use, store and throw away your medicines at www.disposemymeds.org.          This list is accurate as of: 1/8/18 12:10 PM.  Always use your most recent med list.                   Brand Name Dispense Instructions for use Diagnosis    cyclobenzaprine 5 MG tablet    FLEXERIL    15 tablet    Take 1 tablet (5 mg) by mouth 3 times daily as needed for muscle spasms        ibuprofen 600 MG tablet    ADVIL/MOTRIN    30 tablet    Take 1 tablet (600 mg) by mouth every 6 hours as needed for moderate pain

## 2018-01-09 LAB
ALBUMIN SERPL-MCNC: 3.7 G/DL (ref 3.4–5)
ALP SERPL-CCNC: 84 U/L (ref 40–150)
ALT SERPL W P-5'-P-CCNC: 11 U/L (ref 0–50)
ANION GAP SERPL CALCULATED.3IONS-SCNC: 8 MMOL/L (ref 3–14)
AST SERPL W P-5'-P-CCNC: 15 U/L (ref 0–45)
BACTERIA SPEC CULT: NORMAL
BILIRUB SERPL-MCNC: 0.2 MG/DL (ref 0.2–1.3)
BUN SERPL-MCNC: 11 MG/DL (ref 7–30)
C TRACH DNA SPEC QL NAA+PROBE: NEGATIVE
CALCIUM SERPL-MCNC: 8.9 MG/DL (ref 8.5–10.1)
CHLORIDE SERPL-SCNC: 108 MMOL/L (ref 94–109)
CO2 SERPL-SCNC: 22 MMOL/L (ref 20–32)
CREAT SERPL-MCNC: 0.48 MG/DL (ref 0.52–1.04)
GFR SERPL CREATININE-BSD FRML MDRD: >90 ML/MIN/1.7M2
GLUCOSE SERPL-MCNC: 83 MG/DL (ref 70–99)
N GONORRHOEA DNA SPEC QL NAA+PROBE: NEGATIVE
POTASSIUM SERPL-SCNC: 3.8 MMOL/L (ref 3.4–5.3)
PROT SERPL-MCNC: 8.2 G/DL (ref 6.8–8.8)
SODIUM SERPL-SCNC: 138 MMOL/L (ref 133–144)
SPECIMEN SOURCE: NORMAL

## 2018-01-16 ENCOUNTER — MYC MEDICAL ADVICE (OUTPATIENT)
Dept: INTERNAL MEDICINE | Facility: CLINIC | Age: 23
End: 2018-01-16

## 2018-01-18 ENCOUNTER — OFFICE VISIT (OUTPATIENT)
Dept: INTERNAL MEDICINE | Facility: CLINIC | Age: 23
End: 2018-01-18
Payer: COMMERCIAL

## 2018-01-18 VITALS
SYSTOLIC BLOOD PRESSURE: 96 MMHG | HEART RATE: 100 BPM | DIASTOLIC BLOOD PRESSURE: 50 MMHG | BODY MASS INDEX: 24.66 KG/M2 | WEIGHT: 148 LBS | OXYGEN SATURATION: 99 % | TEMPERATURE: 97.6 F | HEIGHT: 65 IN

## 2018-01-18 DIAGNOSIS — R10.84 DIFFUSE ABDOMINAL PAIN: ICD-10-CM

## 2018-01-18 DIAGNOSIS — R10.13 ABDOMINAL PAIN, EPIGASTRIC: Primary | ICD-10-CM

## 2018-01-18 PROCEDURE — 99214 OFFICE O/P EST MOD 30 MIN: CPT | Performed by: NURSE PRACTITIONER

## 2018-01-18 NOTE — PROGRESS NOTES
SUBJECTIVE:                                                    Yoana Henley is a 23 year old female who presents to clinic today for the following health issues:  Seen 1/8/18 for abdominal pain , continues with pain ,  And bloating -worse when eating         Abdominal Pain      Duration: approx 2 months    Description (location/character/radiation): epigastric, sharp or burning       Associated flank pain: None    Intensity:  mild    Accompanying signs and symptoms:        Fever/Chills: no        Gas/Bloating: YES       Nausea/vomitting: no, does have poor appetite and early satiety         Diarrhea: no        Dysuria or Hematuria: no     History (previous similar pain/trauma/previous testing): pelvic US, labs, UA, STD screening all normal.  Has not completed H pylori testing.  Pt reports being treated for H pylori one year ago.    Precipitating or alleviating factors:       Pain worse with eating/BM/urination: worse with food       Pain relieved by BM: no     Therapies tried and outcome: omeprazole 20 mg daily    LMP:  Not known, partner states they have tried x 3 years to conceive, wants to know who to see for this concern      There is no problem list on file for this patient.    History reviewed. No pertinent surgical history.    Social History   Substance Use Topics     Smoking status: Never Smoker     Smokeless tobacco: Never Used     Alcohol use No     Family History   Problem Relation Age of Onset     Family History Negative Mother      Family History Negative Father          Current Outpatient Prescriptions   Medication Sig Dispense Refill     cyclobenzaprine (FLEXERIL) 5 MG tablet Take 1 tablet (5 mg) by mouth 3 times daily as needed for muscle spasms 15 tablet 0     ibuprofen (ADVIL/MOTRIN) 600 MG tablet Take 1 tablet (600 mg) by mouth every 6 hours as needed for moderate pain 30 tablet 0     BP Readings from Last 3 Encounters:   01/18/18 96/50   01/08/18 110/78   12/18/17 97/61    Wt Readings from Last  "3 Encounters:   01/18/18 148 lb (67.1 kg)   01/08/18 149 lb 1.6 oz (67.6 kg)   12/18/17 150 lb (68 kg)                        ROS:  C: NEGATIVE for fever, chills, change in weight  E/M: NEGATIVE for ear, mouth and throat problems  R: NEGATIVE for significant cough or SOB  CV: NEGATIVE for chest pain, palpitations or peripheral edema  ROS otherwise negative    OBJECTIVE:     BP 96/50  Pulse 100  Temp 97.6  F (36.4  C) (Oral)  Ht 5' 5\" (1.651 m)  Wt 148 lb (67.1 kg)  LMP 12/19/2017  SpO2 99%  BMI 24.63 kg/m2  Body mass index is 24.63 kg/(m^2).  GENERAL: healthy, alert and no distress  NECK: no adenopathy, no asymmetry, masses, or scars and thyroid normal to palpation  RESP: lungs clear to auscultation - no rales, rhonchi or wheezes  CV: regular rate and rhythm, normal S1 S2, no S3 or S4, no murmur, click or rub, no peripheral edema and peripheral pulses strong  ABDOMEN: tenderness epigastric and hypoactive BS        ASSESSMENT/PLAN:               ICD-10-CM    1. Abdominal pain, epigastric R10.13       to OB/GYN for fertility concerns      Patient Instructions   Increase omeprazole to 2 tab every day  Bring in stool test for H pylori  Consider referral for endoscopy.    Bernice Osman, NP  [facility]  "

## 2018-01-18 NOTE — NURSING NOTE
"Chief Complaint   Patient presents with     RECHECK   Abdominal pain     Initial BP 96/50  Pulse 100  Temp 97.6  F (36.4  C) (Oral)  Ht 5' 5\" (1.651 m)  Wt 148 lb (67.1 kg)  LMP 12/19/2017  SpO2 99%  BMI 24.63 kg/m2 Estimated body mass index is 24.63 kg/(m^2) as calculated from the following:    Height as of this encounter: 5' 5\" (1.651 m).    Weight as of this encounter: 148 lb (67.1 kg).  Medication Reconciliation: complete    "

## 2018-01-18 NOTE — PATIENT INSTRUCTIONS
Increase omeprazole to 2 tab every day  Bring in stool test for H pylori  Consider referral for endoscopy.    Bernice Osman CNP

## 2018-01-18 NOTE — MR AVS SNAPSHOT
"              After Visit Summary   1/18/2018    Yoana Henley    MRN: [id number]           Patient Information     Date Of Birth          1995        Visit Information        Provider Department      1/18/2018 1:00 PM Bernice Osman NP [facility]        Care Instructions    Increase omeprazole to 2 tab every day  Bring in stool test for H pylori  Consider referral for endoscopy.    Bernice Osman CNP            Follow-ups after your visit        Who to contact     If you have questions or need follow up information about today's clinic visit or your schedule please contact [facility] directly at [phone].  Normal or non-critical lab and imaging results will be communicated to you by MyChart, letter or phone within 4 business days after the clinic has received the results. If you do not hear from us within 7 days, please contact the clinic through [employer] or phone. If you have a critical or abnormal lab result, we will notify you by phone as soon as possible.  Submit refill requests through Tippr or call your pharmacy and they will forward the refill request to us. Please allow 3 business days for your refill to be completed.          Additional Information About Your Visit        MyChart Information     Tippr gives you secure access to your electronic health record. If you see a primary care provider, you can also send messages to your care team and make appointments. If you have questions, please call your primary care clinic.  If you do not have a primary care provider, please call [phone] and they will assist you.        Care EveryWhere ID     This is your Care EveryWhere ID. This could be used by other organizations to access your [place] medical records  [id number]        Your Vitals Were     Pulse Temperature Height Last Period Pulse Oximetry BMI (Body Mass Index)    100 97.6  F (36.4  C) (Oral) 5' 5\" (1.651 m) 12/19/2017 99% 24.63 kg/m2       Blood " Pressure from Last 3 Encounters:   01/18/18 96/50   01/08/18 110/78   12/18/17 97/61    Weight from Last 3 Encounters:   01/18/18 148 lb (67.1 kg)   01/08/18 149 lb 1.6 oz (67.6 kg)   12/18/17 150 lb (68 kg)              Today, you had the following     No orders found for display       Primary Care Provider Fax #    Physician No Ref-Primary [phone]       No address on file        Equal Access to Services     DEISY GIRALDO : Hadii aad ku hadasho Soomaali, waaxda luqadaha, qaybta kaalmada adeegyada, waxay sanazin dylann daryl dobbins . So [facility] [phone].    ATENCIÓN: Si habla español, tiene a rollins disposición servicios gratuitos de asistencia lingüística. [facility] [phone].    We comply with applicable federal civil rights laws and [place] laws. We do not discriminate on the basis of race, color, national origin, age, disability, sex, sexual orientation, or gender identity.            Thank you!     Thank you for choosing [facility]  for your care. Our goal is always to provide you with excellent care. Hearing back from our patients is one way we can continue to improve our services. Please take a few minutes to complete the written survey that you may receive in the mail after your visit with us. Thank you!             Your Updated Medication List - Protect others around you: Learn how to safely use, store and throw away your medicines at www.disposemymeds.org.          This list is accurate as of: 1/18/18  1:23 PM.  Always use your most recent med list.                   Brand Name Dispense Instructions for use Diagnosis    cyclobenzaprine 5 MG tablet    FLEXERIL    15 tablet    Take 1 tablet (5 mg) by mouth 3 times daily as needed for muscle spasms        ibuprofen 600 MG tablet    ADVIL/MOTRIN    30 tablet    Take 1 tablet (600 mg) by mouth every 6 hours as needed for moderate pain

## 2018-01-19 DIAGNOSIS — R10.84 DIFFUSE ABDOMINAL PAIN: ICD-10-CM

## 2018-01-19 PROCEDURE — 87338 HPYLORI STOOL AG IA: CPT | Performed by: NURSE PRACTITIONER

## 2018-01-21 LAB
H PYLORI AG STL QL IA: NORMAL
SPECIMEN SOURCE: NORMAL

## 2018-01-22 ENCOUNTER — TELEPHONE (OUTPATIENT)
Dept: INTERNAL MEDICINE | Facility: CLINIC | Age: 23
End: 2018-01-22

## 2018-01-22 DIAGNOSIS — R10.13 ABDOMINAL PAIN, EPIGASTRIC: Primary | ICD-10-CM

## 2018-01-22 NOTE — TELEPHONE ENCOUNTER
Please advise pt H pylori was negative.  Please continue omeprazole and referral to GI done.  Bernice Osman CNP

## 2018-01-22 NOTE — TELEPHONE ENCOUNTER
Pt and pt's  call back, pt gives verbal authorization to give results to . Relay provider message below and gave referral phone number.

## 2018-01-29 ENCOUNTER — HEALTH MAINTENANCE LETTER (OUTPATIENT)
Age: 23
End: 2018-01-29

## 2018-01-29 ENCOUNTER — OFFICE VISIT (OUTPATIENT)
Dept: OBGYN | Facility: CLINIC | Age: 23
End: 2018-01-29
Payer: COMMERCIAL

## 2018-01-29 VITALS
BODY MASS INDEX: 24.99 KG/M2 | DIASTOLIC BLOOD PRESSURE: 60 MMHG | WEIGHT: 150 LBS | HEIGHT: 65 IN | SYSTOLIC BLOOD PRESSURE: 104 MMHG

## 2018-01-29 DIAGNOSIS — N97.9 PRIMARY FEMALE INFERTILITY: Primary | ICD-10-CM

## 2018-01-29 PROCEDURE — 99204 OFFICE O/P NEW MOD 45 MIN: CPT | Performed by: OBSTETRICS & GYNECOLOGY

## 2018-01-29 RX ORDER — LETROZOLE 2.5 MG/1
2.5 TABLET, FILM COATED ORAL DAILY
Qty: 5 TABLET | Refills: 1 | Status: SHIPPED | OUTPATIENT
Start: 2018-01-29 | End: 2019-01-24

## 2018-01-29 RX ORDER — PRENATAL VIT/IRON FUM/FOLIC AC 27MG-0.8MG
1 TABLET ORAL DAILY
Qty: 100 TABLET | Refills: 3 | Status: SHIPPED | OUTPATIENT
Start: 2018-01-29 | End: 2019-01-24

## 2018-01-29 NOTE — PATIENT INSTRUCTIONS
Cycle instructions:    The first day of bleeding/period is called Day 1.    Letrozole is taken Days 3-7 of cycle, about same time each day.  Take the medication even if you are still bleeding.    Start testing for ovulation using the store bought ovulation predictor kits on Day 11 of cycle.  When you get a positive surge, you will most likely be ovulating within the next 24 hours. Test the urine about the same time each day (should try to test between 2-4 pm, empty bladder about noon.)    The test is positive when you see 2 dark solid lines (one faint line and one dark line is NOT positive). Follow the directions with the kit, as some brands and digital tests will have different positive results. Once the test is positive, you can stop testing. Have sex/intercourse the day the test is positive and the next day.    Schedule a lab only appointment for about 7-9 days after your ovulation test is positive to check the progesterone level and make sure ovulation occurred.     If you don't have a period by 15 days after LH kit is positive (surge), then do a urine pregnancy test at home and call clinic if positive. If it is negative, retest in 5-7 days if no period. Call or send a Verical message with your results. Depending on the ultrasound results and labs, we may need to adjust the dose if you are not pregnant in this cycle.    Basic info:    The ovulation predictor kits are found in the condom/tampon section of the store.  Clear Blue Easy brand is simple to read.    Most women will get a positive LH surge before day 20 of the cycle.    Letrozole is not FDA approved for this indication (ovulation help), but there are studies showing pregnancy rates that are higher than with Clomid, especially when Clomid has not worked.  Aromatase inhibitors are generally well tolerated. The main side effects are hot flushes and gastrointestinal events (nausea and vomiting), headache, back pain, and leg cramps.  Take it at same time  each night to minimize side effects.    There is a chance of twins (around 3-4%) Rarely, women will develop painful cysts on the ovaries. Because we are planning an ultrasound for you, we will see this if this is the case. If a cyst develops, you may need to take a cycle or two off from attempting to conceive while we wait for the cyst to go away.    Do not use lubricants with intercourse when trying to get pregnant, unless you use a lubricant called PreSeed, which is fine.

## 2018-01-29 NOTE — PROGRESS NOTES
FERTILITY OB/GYN OFFICE VISIT    Yoana Henley was seen today for initial infertility consultation.   She is a 23 year old year old G0 who has been attempting conception for 3 year(s).   Here with her partner. Partner speaks English.    Prior pregnancy history is as follows:   Obstetric History     No data available        History of dilation and suction curettage: No.    HX:  No Known Allergies  History reviewed. No pertinent past medical history.  History reviewed. No pertinent surgical history.  Social History     Social History     Marital status:      Spouse name: N/A     Number of children: N/A     Years of education: N/A     Occupational History     Not on file.     Social History Main Topics     Smoking status: Never Smoker     Smokeless tobacco: Never Used     Alcohol use No     Drug use: No     Sexual activity: Yes     Partners: Male     Other Topics Concern     Not on file     Social History Narrative     Family History   Problem Relation Age of Onset     Family History Negative Mother      Family History Negative Father      Current Outpatient Prescriptions   Medication Sig Dispense Refill     OMEPRAZOLE PO          GYNECOLOGIC HISTORY:  Menarche at age 13.  Periods are regular q 30 days, lasting 7 days.   Cyclic symptoms include  Back pain, pelvic pain for 2 days (takes Advil which is helpful).   Known history of endometriosis No.  Frequency of coitus: 3-4 x per week.      STD History: No STD history.  PID History: No.  Ovarian cysts: No.  IUD use: No. Had Depo many years ago.  Galactorrhea: No.  CHANNING exposure:No.  Hirsuitism: No.  Intolerance to hot or cold: No.  Visual changes: No.  Significant change in weight within last year: No.   History of abnormal pap: No.    Occupational exposures: No.  Exercise: No.  High levels of stress: No.  Dieting/changes in weight: No.  Smoking: No.  Heavy alcohol use? No.  Caffeine: No    Her partner is healthy. Smoker: No. Caffeine: Rarely. Prior children with  "another partner: No.    Family members with infertility, birth defects, genetic mutations, or mental retardation: No.   Women in the family with premature ovarian failure: No.   Males in the family with learning problems, developmental delay, or autistic features: No.          PAST INFERTILITY EVALUATION AND TREATMENT:    HSG: Not yet performed  US: 12/18/2017: normal ovaries, no abnormalities seen    Hormonal evaluation has included:   Day 3 FSH: Not Examined. (FSH <10 mIU/mL - adequate ovarian reserve, 10-15 borderline, >20 pregnancy unlikely using her own oocytes.)    Day 3 estradiol: Not Examined. (<80pg/mL suggestive of adequate ovarian reserve.)    Day 21 (late luteal) progesterone: Not Examined (>3 ng/mL suggests ovulation) (Day 21 in 28 day cycle, one week prior to expected start of menses)  AMH: Not Examined. (>1 ng/mL but <3.5 suggests a good & safe response to stimulation)    TSH: Not Examined  Prolactin: Not Examined      Semen Analysis on partner has not yet been performed.    Past infertility treatment included: none.    ROS: negative for 12 systems unless noted above in HPI     EXAM:  Vitals: /60 (BP Location: Right arm, Patient Position: Chair, Cuff Size: Adult Regular)  Ht 5' 5\" (1.651 m)  Wt 150 lb (68 kg)  LMP 01/19/2018 (Exact Date)  BMI 24.96 kg/m2  BMI= Body mass index is 24.96 kg/(m^2).      Gen: Alert, oriented, in no distress. *Normal body habitus. Complete development of secondary sexual characteristics.   Psych: Normal mood and appropriate affect.  Skin: No signs of androgen excess - no hirsutism, acne, male pattern baldness.  HEENT: Thyroid not enlarged.  Breasts: No evidence of galactorrhea.  Lungs: Respirations unlabored and without SOB.   Abdominal exam: No mass, tenderness, or organomegaly. Abdominal obesity: No.  Chest: Not shield/square shaped to suggest So Syndrome      ASSESSMENT/ PLAN:    1. Primary female infertility  - OMEPRAZOLE PO;   - letrozole (FEMARA) 2.5 MG " tablet; Take 1 tablet (2.5 mg) by mouth daily  Dispense: 5 tablet; Refill: 1  - Prenatal Vit-Fe Fumarate-FA (PRENATAL MULTIVITAMIN PLUS IRON) 27-0.8 MG TABS per tablet; Take 1 tablet by mouth daily  Dispense: 100 tablet; Refill: 3  - Follicle stimulating hormone; Future  - Estradiol; Future  - **TSH with free T4 reflex FUTURE anytime; Future  - Prolactin; Future  - Anti-Mullerian hormone; Future    We discussed basics of conception, including regular ovulation and normal ovarian reserve, normal sperm count with normal motility and morphology, and no blockages in the uterus or fallopian tubes. We discussed the ways to evaluate each of these components of basic fertility. She was given written information about the timing of blood work, and arranging semen analysis for her partner through his PCP (will ask  for FM/IM office phone number).  She will call the lab when her next cycle starts to schedule day 3 labs.  Will start Fumara 2.5mg, ovulation kits, and day 21 progesterone (call for progesterone order). I will see her back after all tests are completed to discuss the follow-up plan.    Plan discussion regarding HSG at next appointment. Follow up in the next 5-6 weeks.     Total face to face time 45 minutes, with > 50% spent counseling and/or coordination of care.      Wendy Nascimento,   OB/GYN

## 2018-01-29 NOTE — NURSING NOTE
"Chief Complaint   Patient presents with     Fertility     TRYING TO CONCEIVE FOR ABOUT 3 YRS       Initial /60 (BP Location: Right arm, Patient Position: Chair, Cuff Size: Adult Regular)  Ht 5' 5\" (1.651 m)  Wt 150 lb (68 kg)  LMP 01/19/2018 (Exact Date)  BMI 24.96 kg/m2 Estimated body mass index is 24.96 kg/(m^2) as calculated from the following:    Height as of this encounter: 5' 5\" (1.651 m).    Weight as of this encounter: 150 lb (68 kg).  Medication Reconciliation: complete       Julia Ferrell, PAT      "

## 2018-01-29 NOTE — MR AVS SNAPSHOT
After Visit Summary   1/29/2018    Yoana Henley    MRN: [id number]           Patient Information     Date Of Birth          1995        Visit Information        Provider Department      1/29/2018 2:00 PM Wendy Nascimento DO [facility]        Today's Diagnoses     Primary female infertility    -  1      Care Instructions    Cycle instructions:    The first day of bleeding/period is called Day 1.    Letrozole is taken Days 3-7 of cycle, about same time each day.  Take the medication even if you are still bleeding.    Start testing for ovulation using the store bought ovulation predictor kits on Day 11 of cycle.  When you get a positive surge, you will most likely be ovulating within the next 24 hours. Test the urine about the same time each day (should try to test between 2-4 pm, empty bladder about noon.)    The test is positive when you see 2 dark solid lines (one faint line and one dark line is NOT positive). Follow the directions with the kit, as some brands and digital tests will have different positive results. Once the test is positive, you can stop testing. Have sex/intercourse the day the test is positive and the next day.    Schedule a lab only appointment for about 7-9 days after your ovulation test is positive to check the progesterone level and make sure ovulation occurred.     If you don't have a period by 15 days after LH kit is positive (surge), then do a urine pregnancy test at home and call clinic if positive. If it is negative, retest in 5-7 days if no period. Call or send a [employer] message with your results. Depending on the ultrasound results and labs, we may need to adjust the dose if you are not pregnant in this cycle.    Basic info:    The ovulation predictor kits are found in the condom/tampon section of the store.  Clear Blue Easy brand is simple to read.    Most women will get a positive LH surge before day 20 of the cycle.    Letrozole is not FDA approved  for this indication (ovulation help), but there are studies showing pregnancy rates that are higher than with Clomid, especially when Clomid has not worked.  Aromatase inhibitors are generally well tolerated. The main side effects are hot flushes and gastrointestinal events (nausea and vomiting), headache, back pain, and leg cramps.  Take it at same time each night to minimize side effects.    There is a chance of twins (around 3-4%) Rarely, women will develop painful cysts on the ovaries. Because we are planning an ultrasound for you, we will see this if this is the case. If a cyst develops, you may need to take a cycle or two off from attempting to conceive while we wait for the cyst to go away.    Do not use lubricants with intercourse when trying to get pregnant, unless you use a lubricant called PreSeed, which is fine.          Follow-ups after your visit        Follow-up notes from your care team     Return in about 5 weeks (around 3/5/2018).      Who to contact     If you have questions or need follow up information about today's clinic visit or your schedule please contact [facility] directly at [phone].  Normal or non-critical lab and imaging results will be communicated to you by [employer], letter or phone within 4 business days after the clinic has received the results. If you do not hear from us within 7 days, please contact the clinic through [employer] or phone. If you have a critical or abnormal lab result, we will notify you by phone as soon as possible.  Submit refill requests through [employer] or call your pharmacy and they will forward the refill request to us. Please allow 3 business days for your refill to be completed.          Additional Information About Your Visit        [employer] Information     [employer] gives you secure access to your electronic health record. If you see a primary care provider, you can also send messages to your care team and make appointments. If you have  "questions, please call your primary care clinic.  If you do not have a primary care provider, please call [phone] and they will assist you.        Care EveryWhere ID     This is your Care EveryWhere ID. This could be used by other organizations to access your [place] medical records  [id number]        Your Vitals Were     Height Last Period BMI (Body Mass Index)             5' 5\" (1.651 m) 01/19/2018 (Exact Date) 24.96 kg/m2          Blood Pressure from Last 3 Encounters:   01/29/18 104/60   01/18/18 96/50   01/08/18 110/78    Weight from Last 3 Encounters:   01/29/18 150 lb (68 kg)   01/18/18 148 lb (67.1 kg)   01/08/18 149 lb 1.6 oz (67.6 kg)              Today, you had the following     No orders found for display         Today's Medication Changes          These changes are accurate as of 1/29/18  2:46 PM.  If you have any questions, ask your nurse or doctor.               Start taking these medicines.        Dose/Directions    letrozole 2.5 MG tablet   Commonly known as:  FEMARA   Used for:  Primary female infertility   Started by:  Wendy Nascimento DO        Dose:  2.5 mg   Take 1 tablet (2.5 mg) by mouth daily   Quantity:  5 tablet   Refills:  1       prenatal multivitamin plus iron 27-0.8 MG Tabs per tablet   Used for:  Primary female infertility   Started by:  Wendy Nascimento DO        Dose:  1 tablet   Take 1 tablet by mouth daily   Quantity:  100 tablet   Refills:  3         Stop taking these medicines if you haven't already. Please contact your care team if you have questions.     cyclobenzaprine 5 MG tablet   Commonly known as:  FLEXERIL   Stopped by:  Wendy Nascimento DO           ibuprofen 600 MG tablet   Commonly known as:  ADVIL/MOTRIN   Stopped by:  Wendy Nascimento DO                Where to get your medicines      These medications were sent to [id number] - [address] [place] [address] [address], [facility] [address]     Phone:  [phone]     " letrozole 2.5 MG tablet    prenatal multivitamin plus iron 27-0.8 MG Tabs per tablet                Primary Care Provider Fax #    Physician No Ref-Primary [phone]       No address on file        Equal Access to Services     DEISY GIRALDO : Hadii grisel francis alexis Cedeño, wapriscilada luqadaha, qaybta kaalmada lydia, destin rodríguezjuan peacock. So [facility] [phone].    ATENCIÓN: Si habla español, tiene a rollins disposición servicios gratuitos de asistencia lingüística. Llame al [phone].    We comply with applicable federal civil rights laws and [place] laws. We do not discriminate on the basis of race, color, national origin, age, disability, sex, sexual orientation, or gender identity.            Thank you!     Thank you for choosing [facility]  for your care. Our goal is always to provide you with excellent care. Hearing back from our patients is one way we can continue to improve our services. Please take a few minutes to complete the written survey that you may receive in the mail after your visit with us. Thank you!             Your Updated Medication List - Protect others around you: Learn how to safely use, store and throw away your medicines at www.disposemymeds.org.          This list is accurate as of 1/29/18  2:46 PM.  Always use your most recent med list.                   Brand Name Dispense Instructions for use Diagnosis    letrozole 2.5 MG tablet    FEMARA    5 tablet    Take 1 tablet (2.5 mg) by mouth daily    Primary female infertility       OMEPRAZOLE PO           prenatal multivitamin plus iron 27-0.8 MG Tabs per tablet     100 tablet    Take 1 tablet by mouth daily    Primary female infertility

## 2018-02-22 ENCOUNTER — TELEPHONE (OUTPATIENT)
Dept: INTERNAL MEDICINE | Facility: CLINIC | Age: 23
End: 2018-02-22

## 2018-03-05 ENCOUNTER — TELEPHONE (OUTPATIENT)
Dept: OBGYN | Facility: CLINIC | Age: 23
End: 2018-03-05

## 2018-03-05 DIAGNOSIS — N97.9 FEMALE INFERTILITY: Primary | ICD-10-CM

## 2018-03-05 DIAGNOSIS — N97.9 FEMALE INFERTILITY: ICD-10-CM

## 2018-03-05 PROCEDURE — 36415 COLL VENOUS BLD VENIPUNCTURE: CPT | Performed by: OBSTETRICS & GYNECOLOGY

## 2018-03-05 PROCEDURE — 84144 ASSAY OF PROGESTERONE: CPT | Performed by: OBSTETRICS & GYNECOLOGY

## 2018-03-05 NOTE — TELEPHONE ENCOUNTER
pts  calling.   Pt had a pos ovulation test on 3/4/17.   LMP 2/20/18.     They were told to call to get an order placed to have her progesterone checked on day 21.    Order placed for progesterone.    They will make a lab only.       BRISSA Yanes RN

## 2018-03-06 LAB — PROGEST SERPL-MCNC: 2 NG/ML

## 2018-03-07 ENCOUNTER — TELEPHONE (OUTPATIENT)
Dept: OBGYN | Facility: CLINIC | Age: 23
End: 2018-03-07

## 2018-03-07 NOTE — TELEPHONE ENCOUNTER
Please let patient know her progesterone level does not support ovulation.   Can you have her make a follow up visit?     Also, it looks like there are some future orders that have yet been collected and run. Can you look into this and have her get these labs so that they are resulted prior to her appointment with me?  Wendy Nascimento, DO  OB/GYN

## 2018-03-07 NOTE — TELEPHONE ENCOUNTER
Attempted to reach patient however  was present. Will attempt to reach again.     Wendy Nascimento, DO  OB/GYN

## 2018-03-07 NOTE — TELEPHONE ENCOUNTER
Nursery  Record    Subjective:     JERILYN Mccabe is a female infant born on 2018 at 1:08 PM.  She weighed 3.05 kg and measured 19.5\" in length. Apgars were 9 and 9.     Maternal Data:     Delivery Type: Vaginal, Spontaneous Delivery   Delivery Resuscitation:  Routine  Number of Vessels:  3  Cord Events: Loose x1, reduced  Meconium Stained:  Yes    Information for the patient's mother:  Sofi Sierra [[id number]]   Gestational Age: 36w3d   Prenatal Labs:  Lab Results   Component Value Date/Time    ABO/Rh(D) A POSITIVE 2017 02:50 PM    HBsAg, External negative 2016    HIV, External negative 2016    Rubella, External immune 2016    RPR, External NR 2016    Gonorrhea, External negative 2016    Chlamydia, External negative 2016    GrBStrep, External negative 2017    ABO,Rh A+ 2016       Feeding Method: Breast feeding    Objective:     Visit Vitals    Pulse 124    Temp 98.3 °F (36.8 °C)    Resp 42    Ht 49.5 cm    Wt 2.869 kg    HC 33 cm    BMI 11.69 kg/m2       Results for orders placed or performed during the hospital encounter of 17   BILIRUBIN, TOTAL   Result Value Ref Range    Bilirubin, total 1.5 (L) 6.0 - 10.0 MG/DL      Recent Results (from the past 24 hour(s))   BILIRUBIN, TOTAL    Collection Time: 17  5:00 AM   Result Value Ref Range    Bilirubin, total 1.5 (L) 6.0 - 10.0 MG/DL       Physical Exam:  Code for table:  O No abnormality  X Abnormally (describe abnormal findings) Admission Exam  CODE Admission Exam  Description of  Findings DischargeExam  CODE Discharge Exam  Description of  Findings   General Appearance 0 term O Term, AGA, active   Skin 0 Pink no lesions O No jaundice or rash, +small MS over sacrum   Head, Neck 0 AFOF O AFOF   Eyes 0 RR x 2 O Clear   Ears, Nose, & Throat 0 Palate intact O Ears nl, nares patent   Thorax 0 symmetric O Faint, tiny polythelia on Right   Lungs 0 clear O CTA b/l, no distress Spoke with .  States he did get other labs done because they wanted to talk to you first.  Insisting on a call from you before scheduling any appts.  Please call:  [phone]  Jolly Goldberg RN     Heart 0 No murmurs O RRR, no murmur   Abdomen 0 3 V soft O Soft, +BS, no HSM or hernia   Genitalia 0 Nl female O Nl-female   Anus 0 patent O No rash   Trunk and Spine 0 straight no dimple O Intact   Extremities 0 FROM no click O No clavicular crepitus   Reflexes 0 +MSG O Nl-tone   Examiner LILLY Mejias MM, PA-C     Immunization History   Administered Date(s) Administered    Hep B, Adol/Ped 2017     Hearing Screen:  Hearing Screen: Yes (17 231)  Left Ear: Pass (17 231)  Right Ear: Pass ( 5152)    Metabolic Screen:  Initial [place] Screen Completed: Yes (17)    CHD Oxygen Saturation Screening:  Pre Ductal O2 Sat (%): 98  Post Ductal O2 Sat (%): 98    Assessment/Plan:     Principal Problem:    Liveborn infant by vaginal delivery (2017)    Impression on admission: Term   rather precipitous delivery, mec present but karissa not at delivery due to precipitous event. Infant examined in delivery room, no risk factors. No problems identified. 17  Delfaus    Progress Note: 17 0900  Breast fed multiple times, voided and stooled,n clinical jaundice weight loss OK. HEENT nl clear chest no murmurs soft abdomen good tone and reflexes. Routine care to continue. Delfaus    Impression on Discharge:   [facility] for this term AGAfemale born via  to GBS negative, 26yo, , mom; ROM at delivery with MSF. Infant remained stable overnight, no adverse events. Exclusively BFing, voiding and stooling. BW down 6%. TsB is LRZ at 39hrs. Exam as above. Will discharge infant home today with mom to f/u with PMD, Pediatrics at [facility],  at 2:30pm.  Tracee Schmitz PA-C 2017 0750  Discharge weight:    Wt Readings from Last 1 Encounters:   17 2.869 kg (19 %, Z= -0.88)*     * Growth percentiles are based on WHO (Girls, 0-2 years) data.

## 2018-03-07 NOTE — TELEPHONE ENCOUNTER
Spoke with patient and .   Will have labs done and then schedule follow up appointment.     Wendy Nascimneto, DO  OB/GYN

## 2018-03-07 NOTE — TELEPHONE ENCOUNTER
calling for results of lab.  Please review and advise.  Progesterone 2.0   ng/mL Final 03/05/2018  4:02 PM 51   Comment:   Progesterone Reference Range   Female   Nonpregnant           Follicular      0.15-1.4           Luteal          3.4-25.6           Postmenopausal  <0.15-0.73   Pregnant           1st Trimester   11.2-90.0           2nd Trimester   25.6-89.4           3rd Trimester   48.4-422.5       Jolly Goldberg RN

## 2018-08-22 ENCOUNTER — TELEPHONE (OUTPATIENT)
Dept: INTERNAL MEDICINE | Facility: CLINIC | Age: 23
End: 2018-08-22

## 2018-08-22 NOTE — LETTER
[facility]  [address], [address]  [place], [place]  [address]                                            TEL:[phone]  FAX:[phone]      Yoana Henley  [address]   [facility] [place] [address]      August 22, 2018    Dear Yoana,    At [facility] we care about your health and well-being.  A review of your chart has indicated that you are due for a Pap.  Please contact us at [phone] to schedule an appointment.    If you have already had one or all of the above screening tests at another facility, please call us to update your chart.     Sincerely,      Bernice Osman C.N.P.

## 2018-08-22 NOTE — TELEPHONE ENCOUNTER
Panel Management Review      Patient has the following on her problem list: None      Composite cancer screening  Chart review shows that this patient is due/due soon for the following Pap Smear  Summary:    Patient is due/failing the following:   PAP    Action needed:   Patient needs office visit for Pap.    Type of outreach:    Sent letter.    Questions for provider review:    None                                                                                                                                    BRISSA SALAZAR CMA       Chart routed to Care Team .

## 2019-01-24 ENCOUNTER — APPOINTMENT (OUTPATIENT)
Dept: ULTRASOUND IMAGING | Facility: CLINIC | Age: 24
End: 2019-01-24
Payer: MEDICAID

## 2019-01-24 ENCOUNTER — HOSPITAL ENCOUNTER (EMERGENCY)
Facility: CLINIC | Age: 24
Discharge: HOME | End: 2019-01-25
Attending: EMERGENCY MEDICINE | Admitting: EMERGENCY MEDICINE
Payer: MEDICAID

## 2019-01-24 VITALS
DIASTOLIC BLOOD PRESSURE: 71 MMHG | SYSTOLIC BLOOD PRESSURE: 112 MMHG | HEART RATE: 88 BPM | TEMPERATURE: 98.4 F | RESPIRATION RATE: 18 BRPM | OXYGEN SATURATION: 100 %

## 2019-01-24 DIAGNOSIS — R11.2 NON-INTRACTABLE VOMITING WITH NAUSEA, UNSPECIFIED VOMITING TYPE: ICD-10-CM

## 2019-01-24 DIAGNOSIS — E87.6 HYPOKALEMIA: ICD-10-CM

## 2019-01-24 DIAGNOSIS — Z34.90 PREGNANCY, UNSPECIFIED GESTATIONAL AGE: ICD-10-CM

## 2019-01-24 DIAGNOSIS — B96.89 BACTERIAL VAGINOSIS: ICD-10-CM

## 2019-01-24 DIAGNOSIS — N76.0 BACTERIAL VAGINOSIS: ICD-10-CM

## 2019-01-24 LAB
ALBUMIN SERPL-MCNC: 3.4 G/DL (ref 3.4–5)
ALBUMIN UR-MCNC: NEGATIVE MG/DL
ALP SERPL-CCNC: 82 U/L (ref 40–150)
ALT SERPL W P-5'-P-CCNC: 14 U/L (ref 0–50)
ANION GAP SERPL CALCULATED.3IONS-SCNC: 8 MMOL/L (ref 3–14)
APPEARANCE UR: CLEAR
AST SERPL W P-5'-P-CCNC: 9 U/L (ref 0–45)
B-HCG SERPL-ACNC: 9043 IU/L (ref 0–5)
BASOPHILS # BLD AUTO: 0 10E9/L (ref 0–0.2)
BASOPHILS NFR BLD AUTO: 0.4 %
BILIRUB SERPL-MCNC: 0.2 MG/DL (ref 0.2–1.3)
BILIRUB UR QL STRIP: NEGATIVE
BUN SERPL-MCNC: 11 MG/DL (ref 7–30)
CALCIUM SERPL-MCNC: 9 MG/DL (ref 8.5–10.1)
CHLORIDE SERPL-SCNC: 105 MMOL/L (ref 94–109)
CO2 SERPL-SCNC: 22 MMOL/L (ref 20–32)
COLOR UR AUTO: YELLOW
CREAT SERPL-MCNC: 0.58 MG/DL (ref 0.52–1.04)
DIFFERENTIAL METHOD BLD: NORMAL
EOSINOPHIL # BLD AUTO: 0.1 10E9/L (ref 0–0.7)
EOSINOPHIL NFR BLD AUTO: 1.4 %
ERYTHROCYTE [DISTWIDTH] IN BLOOD BY AUTOMATED COUNT: 13.8 % (ref 10–15)
GFR SERPL CREATININE-BSD FRML MDRD: >90 ML/MIN/{1.73_M2}
GLUCOSE SERPL-MCNC: 92 MG/DL (ref 70–99)
GLUCOSE UR STRIP-MCNC: NEGATIVE MG/DL
HCG UR QL: POSITIVE
HCT VFR BLD AUTO: 37.1 % (ref 35–47)
HGB BLD-MCNC: 12 G/DL (ref 11.7–15.7)
HGB UR QL STRIP: ABNORMAL
IMM GRANULOCYTES # BLD: 0 10E9/L (ref 0–0.4)
IMM GRANULOCYTES NFR BLD: 0.3 %
KETONES UR STRIP-MCNC: NEGATIVE MG/DL
LEUKOCYTE ESTERASE UR QL STRIP: ABNORMAL
LIPASE SERPL-CCNC: 104 U/L (ref 73–393)
LYMPHOCYTES # BLD AUTO: 2.4 10E9/L (ref 0.8–5.3)
LYMPHOCYTES NFR BLD AUTO: 31.5 %
MAGNESIUM SERPL-MCNC: 2.1 MG/DL (ref 1.6–2.3)
MCH RBC QN AUTO: 26.9 PG (ref 26.5–33)
MCHC RBC AUTO-ENTMCNC: 32.3 G/DL (ref 31.5–36.5)
MCV RBC AUTO: 83 FL (ref 78–100)
MONOCYTES # BLD AUTO: 0.9 10E9/L (ref 0–1.3)
MONOCYTES NFR BLD AUTO: 11.2 %
MUCOUS THREADS #/AREA URNS LPF: PRESENT /LPF
NEUTROPHILS # BLD AUTO: 4.2 10E9/L (ref 1.6–8.3)
NEUTROPHILS NFR BLD AUTO: 55.2 %
NITRATE UR QL: NEGATIVE
NRBC # BLD AUTO: 0 10*3/UL
NRBC BLD AUTO-RTO: 0 /100
PH UR STRIP: 6 PH (ref 5–7)
PLATELET # BLD AUTO: 300 10E9/L (ref 150–450)
POTASSIUM SERPL-SCNC: 3.3 MMOL/L (ref 3.4–5.3)
PROT SERPL-MCNC: 8.4 G/DL (ref 6.8–8.8)
RBC # BLD AUTO: 4.46 10E12/L (ref 3.8–5.2)
RBC #/AREA URNS AUTO: 3 /HPF (ref 0–2)
SODIUM SERPL-SCNC: 135 MMOL/L (ref 133–144)
SOURCE: ABNORMAL
SP GR UR STRIP: 1.02 (ref 1–1.03)
SPECIMEN SOURCE: ABNORMAL
SQUAMOUS #/AREA URNS AUTO: 3 /HPF (ref 0–1)
UROBILINOGEN UR STRIP-MCNC: 0 MG/DL (ref 0–2)
WBC # BLD AUTO: 7.7 10E9/L (ref 4–11)
WBC #/AREA URNS AUTO: 3 /HPF (ref 0–5)
WET PREP SPEC: ABNORMAL

## 2019-01-24 PROCEDURE — 86901 BLOOD TYPING SEROLOGIC RH(D): CPT | Performed by: EMERGENCY MEDICINE

## 2019-01-24 PROCEDURE — 96374 THER/PROPH/DIAG INJ IV PUSH: CPT

## 2019-01-24 PROCEDURE — 25000128 H RX IP 250 OP 636: Performed by: EMERGENCY MEDICINE

## 2019-01-24 PROCEDURE — 76817 TRANSVAGINAL US OBSTETRIC: CPT

## 2019-01-24 PROCEDURE — 80053 COMPREHEN METABOLIC PANEL: CPT | Performed by: EMERGENCY MEDICINE

## 2019-01-24 PROCEDURE — 99285 EMERGENCY DEPT VISIT HI MDM: CPT | Mod: 25

## 2019-01-24 PROCEDURE — 83735 ASSAY OF MAGNESIUM: CPT | Performed by: EMERGENCY MEDICINE

## 2019-01-24 PROCEDURE — 25000132 ZZH RX MED GY IP 250 OP 250 PS 637: Performed by: EMERGENCY MEDICINE

## 2019-01-24 PROCEDURE — 86850 RBC ANTIBODY SCREEN: CPT | Performed by: EMERGENCY MEDICINE

## 2019-01-24 PROCEDURE — 93005 ELECTROCARDIOGRAM TRACING: CPT

## 2019-01-24 PROCEDURE — 81025 URINE PREGNANCY TEST: CPT | Performed by: EMERGENCY MEDICINE

## 2019-01-24 PROCEDURE — 86900 BLOOD TYPING SEROLOGIC ABO: CPT | Performed by: EMERGENCY MEDICINE

## 2019-01-24 PROCEDURE — 84702 CHORIONIC GONADOTROPIN TEST: CPT | Performed by: EMERGENCY MEDICINE

## 2019-01-24 PROCEDURE — 85025 COMPLETE CBC W/AUTO DIFF WBC: CPT | Performed by: EMERGENCY MEDICINE

## 2019-01-24 PROCEDURE — 87210 SMEAR WET MOUNT SALINE/INK: CPT | Performed by: EMERGENCY MEDICINE

## 2019-01-24 PROCEDURE — 81001 URINALYSIS AUTO W/SCOPE: CPT | Performed by: EMERGENCY MEDICINE

## 2019-01-24 PROCEDURE — 96361 HYDRATE IV INFUSION ADD-ON: CPT

## 2019-01-24 PROCEDURE — 83690 ASSAY OF LIPASE: CPT | Performed by: EMERGENCY MEDICINE

## 2019-01-24 PROCEDURE — 96375 TX/PRO/DX INJ NEW DRUG ADDON: CPT

## 2019-01-24 RX ORDER — PRENATAL VIT/IRON FUM/FOLIC AC 27MG-0.8MG
1 TABLET ORAL DAILY
Qty: 30 TABLET | Refills: 0 | Status: SHIPPED | OUTPATIENT
Start: 2019-01-24 | End: 2019-02-23

## 2019-01-24 RX ORDER — ONDANSETRON 2 MG/ML
4 INJECTION INTRAMUSCULAR; INTRAVENOUS ONCE
Status: COMPLETED | OUTPATIENT
Start: 2019-01-24 | End: 2019-01-24

## 2019-01-24 RX ORDER — POTASSIUM CHLORIDE 1500 MG/1
40 TABLET, EXTENDED RELEASE ORAL ONCE
Status: COMPLETED | OUTPATIENT
Start: 2019-01-24 | End: 2019-01-24

## 2019-01-24 RX ORDER — KETOROLAC TROMETHAMINE 15 MG/ML
15 INJECTION, SOLUTION INTRAMUSCULAR; INTRAVENOUS ONCE
Status: COMPLETED | OUTPATIENT
Start: 2019-01-24 | End: 2019-01-24

## 2019-01-24 RX ORDER — METRONIDAZOLE 500 MG/1
500 TABLET ORAL 2 TIMES DAILY
Qty: 14 TABLET | Refills: 0 | Status: SHIPPED | OUTPATIENT
Start: 2019-01-24 | End: 2019-01-31

## 2019-01-24 RX ADMIN — ONDANSETRON 4 MG: 2 INJECTION INTRAMUSCULAR; INTRAVENOUS at 22:26

## 2019-01-24 RX ADMIN — SODIUM CHLORIDE 1000 ML: 9 INJECTION, SOLUTION INTRAVENOUS at 22:25

## 2019-01-24 RX ADMIN — POTASSIUM CHLORIDE 40 MEQ: 1500 TABLET, EXTENDED RELEASE ORAL at 23:29

## 2019-01-24 RX ADMIN — KETOROLAC TROMETHAMINE 15 MG: 15 INJECTION, SOLUTION INTRAMUSCULAR; INTRAVENOUS at 22:26

## 2019-01-24 ASSESSMENT — ENCOUNTER SYMPTOMS
FEVER: 0
VOMITING: 1
COUGH: 1
DYSURIA: 0
HEMATURIA: 0
BACK PAIN: 1
SHORTNESS OF BREATH: 0
NAUSEA: 1

## 2019-01-24 NOTE — ED AVS SNAPSHOT
[facility]  [address]  [facility] [address]  Phone:  [phone]  Fax:  [phone]                                    Yoana Henley   MRN: [id number]    Department:  [facility]   Date of Visit:  1/24/2019           After Visit Summary Signature Page    I have received my discharge instructions, and my questions have been answered. I have discussed any challenges I see with this plan with the nurse or doctor.    ..........................................................................................................................................  Patient/Patient Representative Signature      ..........................................................................................................................................  Patient Representative Print Name and Relationship to Patient    ..................................................               ................................................  Date                                   Time    ..........................................................................................................................................  Reviewed by Signature/Title    ...................................................              ..............................................  Date                                               Time          22EPIC Rev 08/18

## 2019-01-25 LAB
ABO + RH BLD: NORMAL
ABO + RH BLD: NORMAL
BLD GP AB SCN SERPL QL: NORMAL
BLOOD BANK CMNT PATIENT-IMP: NORMAL
INTERPRETATION ECG - MUSE: NORMAL
SPECIMEN EXP DATE BLD: NORMAL

## 2019-01-25 NOTE — ED NOTES
Pt originally refusing pelvic exam and transvaginal ultrasound d/t cultural barriers. RN and MD explained reasons for and importance of procedure several times and pt agreed. Pt tolerated procedure well.

## 2019-01-25 NOTE — ED PROVIDER NOTES
History     Chief Complaint:  Nausea & Vomiting    HPI   Yoana Henley is a 24 year old female who presents to the emergency department for evaluation of myriad of complaints, predominately nausea and vomiting. She states that she has had one episode of vomiting a day for the last 2 days, but has been feeling unwell for about a week despite taking Tylenol/Advil at home. She reports a cough, lightheadedness, pelvic pain, and lower back pain. She states that it feels like she is on her period, however she denies any vaginal bleeding. However, she does report vaginal discharge. She denies any fever, dysuria, hematuria, diarrhea or other symptoms. Her last period was on 12/14 and she does report concern for pregnancy; she took a home test that was inconclusive. She has no concern for STIs. Of note, she denies any sick contacts.     Allergies:  NKDA     Medications:    The patient is currently on no regular medications.      Past Medical History:    The patient denies any significant past medical history.    Past Surgical History:    The patient does not have any pertinent past surgical history  Family History:    No past pertinent family history.     Social History:  Marital Status:   [2]  Negative for tobacco use.  Negative for alcohol use.  Positive for sexual intercourse.     Review of Systems   Constitutional: Negative for fever.   Respiratory: Positive for cough. Negative for shortness of breath.    Gastrointestinal: Positive for nausea and vomiting.   Genitourinary: Positive for pelvic pain and vaginal discharge. Negative for dysuria, hematuria and vaginal bleeding.   Musculoskeletal: Positive for back pain (lower).   All other systems reviewed and are negative.      Physical Exam     Patient Vitals for the past 24 hrs:   BP Temp Temp src Pulse Heart Rate Resp SpO2   01/24/19 2340 -- -- -- -- -- -- 100 %   01/24/19 2325 112/71 -- -- 88 -- -- 100 %   01/24/19 2255 -- -- -- -- -- -- 99 %   01/24/19 2155  124/74 -- -- 79 -- -- 100 %   01/24/19 2152 124/74 98.4  F (36.9  C) Oral -- 85 18 100 %     Physical Exam  Nursing note and vitals reviewed.  Constitutional: Well nourished. Resting comfortably.   Eyes: Conjunctiva normal.  Pupils are equal, round, and reactive to light.   ENT: Nose normal. Mucous membranes pink and moist.    Neck: Normal range of motion.  CVS: Normal rate, regular rhythm.  Normal heart sounds.  No murmur.  Pulmonary: Lungs clear to auscultation bilaterally. No wheezes/rales/rhonchi.  GI: Abdomen soft mild suprapubic tenderness.  No rigidity or guarding.  No CVA tenderness.   Pelvic: Normal external genitalia.  No vaginal bleeding.  Minimal white cervical discharge.  No CMT or adnexal tenderness noted.  Chaperone Nurse Emy DANGELO: No calf tenderness or swelling.  Neuro: Alert. Follows simple commands.  Skin: Skin is warm and dry. No rash noted.   Psychiatric: Normal affect.       Emergency Department Course   ECG:  Indication: Weakness  Time: 2231  Vent. Rate 79 bpm. AL interval 194. QRS duration 84. QT/QTc 394/451. P-R-T axis 30 19 32.  Normal sinus rhythm. Normal ECG. Read time: 2232.    Imaging:   Radiographic findings were communicated with the patient who voiced understanding of the findings.  US OB Transvaginal:   IMPRESSION:  1. IUP measuring 5 weeks and 6 days by crown-rump length. No heartbeat  is seen at this point. Small subchorionic hemorrhage.  2. This may be a normal early pregnancy or fetal demise.  3. Recommend short-term follow-up ultrasound to confirm live  pregnancy.            Laboratory:  CBC: WBC: 7.7, HGB: 12.0, PLT: 300  CMP: K: 3.3 (L), o/w WNL (Creatinine: 0.58)    Lipase: 104  Magnesium: 2.1  Blood type: A +  UA with Microscopic: Blood: Small (A), Leukocyte esterase: Trace (A), RBC: 3 (H), Squamous epithelial: 3 (H), Mucous: Present (A), o/w WNL  HCG Qual.: Positive (A)  HCG Quant.: 9043 (H)    Wet prep: Clue cells seen (A), o/w Negative    Interventions:  2225 NS 1L  IV  2226 Zofran, 4 mg, IV injection   Toradol, 15 mg, IV injection  2329 K-Dur, 40 mEq, PO    Emergency Department Course:  Nursing notes and vitals reviewed. (2203) I performed an exam of the patient as documented above.      IV inserted. Medicine administered as documented above. Blood drawn. This was sent to the lab for further testing, results above.     The patient was sent for a transvaginal OB US while in the emergency department, findings above.      The patient provided a urine sample here in the emergency department. This was sent for laboratory testing, findings above.     EKG obtained in the ED, see results above.     (2233) I had a long discussion with the patient about the risks for identifying ectopic pregnancy after she initially refused the ultrasound. She is no agreeable to proceed.      (2240) I performed the pelvic exam.      Findings and plan explained to the Patient. Patient discharged home with instructions regarding supportive care, medications, and reasons to return. The importance of close follow-up was reviewed. The patient was prescribed Flagyl and prenatal vitamins. She was given an OB referral as well.      I personally reviewed the laboratory and imaging results with the Patient and answered all related questions prior to discharge.     Impression & Plan      Medical Decision Making:  Yoana Henley is a 24 year old female presenting with myriad of complaints, predominately nausea, vomiting and pelvic pain.  She is nontoxic, well hydrated.  She has a nonperitoneal abdomen.  Labs without evidence of infection or significant electrolyte abnormality.  UA without gross infection.  Patient is pregnant today confirmed IUP roughly 5 weeks 6d gestation.  Noted also small subchorionic hemorrhage. No identified FHTs though could be secondary to early gestational age. She is not a rhogam candidate.  Patient was tolerating p.o. on reevaluation and reported symptom improvement.  Will discharge home  with Flagyl for management of bacterial vaginosis. Patient denies any STI concerns. She will also sent home with prenatal vitamin.  She was provided OB/GYN referral to establish care within 1 week for likely trending of BHCG and repeat ultrasound.  I did discuss that she will likely benefit from repeat ultrasound in the future particularly in the setting of the subchorionic hemorrhage.  I offered patient antiemetics (B6/unisom) though she declined.  Instructed to return to ED for vaginal bleeding, fever, intractable pain or should symptoms worsen or change.     Critical Care time:  none    Diagnosis:    ICD-10-CM    1. Bacterial vaginosis N76.0 ABO/Rh type and screen    B96.89    2. Pregnancy, unspecified gestational age Z34.90    3. Subchorionic hemorrhage of placenta in first trimester, single or unspecified fetus O41.8X10     O46.8X1    4. Hypokalemia E87.6    5. Non-intractable vomiting with nausea, unspecified vomiting type R11.2        Disposition:  discharged to home    Discharge Medications:     Medication List      Started    metroNIDAZOLE 500 MG tablet  Commonly known as:  FLAGYL  500 mg, Oral, 2 TIMES DAILY     prenatal multivitamin w/iron 27-0.8 MG tablet  1 tablet, Oral, DAILY          Scribe Disclosure:  I, Jasmine Torres, am serving as a scribe on 1/24/2019 at 10:03 PM to personally document services performed by Evie Huynh DO based on my observations and the provider's statements to me.     Jasmine Torres  1/24/2019   [facility] EMERGENCY DEPARTMENT         Evie Huynh DO  01/29/19 5398

## 2019-01-25 NOTE — ED TRIAGE NOTES
Patient presents with nausea and vomiting for the last week, intensity of the nausea and vomiting has increased over the last 24 hours, states it feel like her period is coming, unsure if she is pregnant or not but may be, has taken pregnancy test with unsure results, patient she states she has been able to eat but is not having the appetite

## 2019-02-14 ENCOUNTER — HOSPITAL ENCOUNTER (OUTPATIENT)
Facility: CLINIC | Age: 24
Discharge: HOME | End: 2019-02-14
Attending: OBSTETRICS & GYNECOLOGY | Admitting: OBSTETRICS & GYNECOLOGY
Payer: MEDICAID

## 2019-02-14 VITALS
TEMPERATURE: 98.3 F | SYSTOLIC BLOOD PRESSURE: 91 MMHG | DIASTOLIC BLOOD PRESSURE: 61 MMHG | HEIGHT: 65 IN | RESPIRATION RATE: 18 BRPM | BODY MASS INDEX: 24.99 KG/M2 | HEART RATE: 72 BPM | WEIGHT: 150 LBS

## 2019-02-14 PROCEDURE — 25000132 ZZH RX MED GY IP 250 OP 250 PS 637: Performed by: OBSTETRICS & GYNECOLOGY

## 2019-02-14 PROCEDURE — 40000809 ZZH STATISTIC NO DOCUMENTATION TO SUPPORT CHARGE

## 2019-02-14 PROCEDURE — 25000128 H RX IP 250 OP 636: Performed by: OBSTETRICS & GYNECOLOGY

## 2019-02-14 PROCEDURE — 96376 TX/PRO/DX INJ SAME DRUG ADON: CPT

## 2019-02-14 PROCEDURE — 25800030 ZZH RX IP 258 OP 636: Performed by: OBSTETRICS & GYNECOLOGY

## 2019-02-14 PROCEDURE — 96374 THER/PROPH/DIAG INJ IV PUSH: CPT

## 2019-02-14 RX ORDER — METOCLOPRAMIDE HYDROCHLORIDE 5 MG/ML
10 INJECTION INTRAMUSCULAR; INTRAVENOUS EVERY 6 HOURS
Status: DISCONTINUED | OUTPATIENT
Start: 2019-02-14 | End: 2019-02-14

## 2019-02-14 RX ORDER — METOCLOPRAMIDE HYDROCHLORIDE 5 MG/ML
10 INJECTION INTRAMUSCULAR; INTRAVENOUS EVERY 6 HOURS PRN
Status: DISCONTINUED | OUTPATIENT
Start: 2019-02-14 | End: 2019-02-15 | Stop reason: HOSPADM

## 2019-02-14 RX ORDER — SODIUM CHLORIDE, SODIUM LACTATE, POTASSIUM CHLORIDE, CALCIUM CHLORIDE 600; 310; 30; 20 MG/100ML; MG/100ML; MG/100ML; MG/100ML
INJECTION, SOLUTION INTRAVENOUS CONTINUOUS
Status: DISCONTINUED | OUTPATIENT
Start: 2019-02-14 | End: 2019-02-15 | Stop reason: HOSPADM

## 2019-02-14 RX ORDER — LIDOCAINE 40 MG/G
CREAM TOPICAL
Status: DISCONTINUED | OUTPATIENT
Start: 2019-02-14 | End: 2019-02-15 | Stop reason: HOSPADM

## 2019-02-14 RX ORDER — ONDANSETRON 2 MG/ML
4 INJECTION INTRAMUSCULAR; INTRAVENOUS EVERY 6 HOURS PRN
Status: DISCONTINUED | OUTPATIENT
Start: 2019-02-14 | End: 2019-02-15 | Stop reason: HOSPADM

## 2019-02-14 RX ORDER — ACETAMINOPHEN 325 MG/1
650 TABLET ORAL EVERY 4 HOURS PRN
Status: DISCONTINUED | OUTPATIENT
Start: 2019-02-14 | End: 2019-02-15 | Stop reason: HOSPADM

## 2019-02-14 RX ADMIN — ACETAMINOPHEN 650 MG: 325 TABLET, FILM COATED ORAL at 19:49

## 2019-02-14 RX ADMIN — SODIUM CHLORIDE, POTASSIUM CHLORIDE, SODIUM LACTATE AND CALCIUM CHLORIDE: 600; 310; 30; 20 INJECTION, SOLUTION INTRAVENOUS at 18:08

## 2019-02-14 RX ADMIN — ONDANSETRON 4 MG: 2 INJECTION INTRAMUSCULAR; INTRAVENOUS at 15:14

## 2019-02-14 RX ADMIN — ONDANSETRON 4 MG: 2 INJECTION INTRAMUSCULAR; INTRAVENOUS at 21:54

## 2019-02-14 RX ADMIN — SODIUM CHLORIDE, POTASSIUM CHLORIDE, SODIUM LACTATE AND CALCIUM CHLORIDE 500 ML: 600; 310; 30; 20 INJECTION, SOLUTION INTRAVENOUS at 14:53

## 2019-02-14 ASSESSMENT — MIFFLIN-ST. JEOR: SCORE: 1426.28

## 2019-02-14 NOTE — PLAN OF CARE
Data: Patient presented to [facility]: 2019  2:15 PM.  Reason for maternal/fetal assessment is nausea/ vomiting & diarrhea that started 2 days ago.  She was seen in the office and sent to [facility] for IV hydration and IV Zofran.   Patient is a .  Prenatal record was not available. Pregnancy has been uncomplicated..  Gestational Age 9w0d. VSS. Fetal movement present. Patient denies nausea, vomiting. Support person is present.   Action:  Triage assessment completed. Bill of rights reviewed.  Response: Patient verbalized agreement with plan. Will contact Dr Nancy Diaz with update and further orders.  Kusum Trinh # [phone] [id number] through the IPad

## 2019-02-14 NOTE — PROVIDER NOTIFICATION
02/14/19 1615   Provider Notification   Provider Name/Title Dr. Diaz   Method of Notification Phone   Request Evaluate-Remote   Notification Reason Status Update   orders for plan of care. 2 liters of fluid and then reassess.

## 2019-02-15 NOTE — PLAN OF CARE
Tolerating crackers, jello, juice this shift. No emesis or diarrhea. Plan to discharge home after second bag of fluid in. Zofran called into her pharmacy and  picked it up. With  discussed sign and symptoms to call her doctor, and food choices. Discussed medication. Tylenol given with good relief for a small headache. Knows to discontinue omperizole for now per Dr. Shannon. Will follow up in clinic as scheduled or will call if any concerns. Home at 0176

## 2019-02-15 NOTE — PROVIDER NOTIFICATION
02/14/19 1921   Provider Notification   Provider Name/Title Dr. Diaz   Method of Notification Phone   Request Evaluate-Remote   Notification Reason Medication Request;Status Update   order for tylenol received for headache. May discharge patient after fluids in. Will call in  [facility] to pharmacy

## 2019-02-15 NOTE — DISCHARGE INSTRUCTIONS
Discharge Instruction for Undelivered Patients      You were seen for: iv hydration  We Consulted: Dr. Salazar  You had (Test or Medicine): zofran called into your pharmacy     Diet:   Drink 8 to 12 glasses of liquids (milk, juice, water) every day.  You may eat meals and snacks.     Activity:  Stay on bed rest or partial bed rest. This means activity as normal     Call your provider if you notice:  Swelling in your face or increased swelling in your hands or legs.  Headaches that are not relieved by Tylenol (acetaminophen).  Changes in your vision (blurring: seeing spots or stars.)  Nausea (sick to your stomach) and vomiting (throwing up).   Weight gain of 5 pounds or more per week.  Heartburn that doesn't go away.  Signs of bladder infection: pain when you urinate (use the toilet), need to go more often and more urgently.  The bag of weiss (rupture of membranes) breaks, or you notice leaking in your underwear.  Bright red blood in your underwear.  Abdominal (lower belly) or stomach pain.  For first baby: Contractions (tightening) less than 5 minutes apart for one hour or more.  Second (plus) baby: Contractions (tightening) less than 10 minutes apart and getting stronger.  *If less than 34 weeks: Contractions (tightenings) more than 6 times in one hour.  Increase or change in vaginal discharge (note the color and amount)  Other: if you can't handle food, drink or have  persistent diarrhea      Follow-up:  Make an appointment to be seen on your regula appointment unless something arises.  with your provider. zofran called into your pharmacy

## 2019-05-09 ENCOUNTER — TRANSCRIBE ORDERS (OUTPATIENT)
Dept: MATERNAL FETAL MEDICINE | Facility: CLINIC | Age: 24
End: 2019-05-09

## 2019-05-09 DIAGNOSIS — O26.90 PREGNANCY RELATED CONDITION, ANTEPARTUM: Primary | ICD-10-CM

## 2019-05-15 ENCOUNTER — PRE VISIT (OUTPATIENT)
Dept: MATERNAL FETAL MEDICINE | Facility: CLINIC | Age: 24
End: 2019-05-15

## 2019-05-17 ENCOUNTER — HOSPITAL ENCOUNTER (OUTPATIENT)
Dept: ULTRASOUND IMAGING | Facility: CLINIC | Age: 24
Discharge: HOME | End: 2019-05-17
Attending: REGISTERED NURSE | Admitting: REGISTERED NURSE
Payer: COMMERCIAL

## 2019-05-17 ENCOUNTER — OFFICE VISIT (OUTPATIENT)
Dept: MATERNAL FETAL MEDICINE | Facility: CLINIC | Age: 24
End: 2019-05-17
Attending: REGISTERED NURSE
Payer: COMMERCIAL

## 2019-05-17 DIAGNOSIS — O26.90 PREGNANCY RELATED CONDITION, ANTEPARTUM: ICD-10-CM

## 2019-05-17 DIAGNOSIS — O35.BXX0 FETAL CARDIAC ECHOGENIC FOCUS, ANTEPARTUM, SINGLE OR UNSPECIFIED FETUS: Primary | ICD-10-CM

## 2019-05-17 PROCEDURE — 76811 OB US DETAILED SNGL FETUS: CPT

## 2019-05-17 NOTE — PROGRESS NOTES
"Please see \"Imaging\" tab under \"Chart Review\" for details of today's US at the [facility].    Isaak Vyas MD  Maternal-Fetal Medicine    "

## 2019-06-05 ENCOUNTER — HOSPITAL ENCOUNTER (EMERGENCY)
Facility: CLINIC | Age: 24
End: 2019-06-05
Payer: COMMERCIAL

## 2019-06-05 ENCOUNTER — HOSPITAL ENCOUNTER (OUTPATIENT)
Facility: CLINIC | Age: 24
Discharge: HOME | End: 2019-06-05
Attending: OBSTETRICS & GYNECOLOGY | Admitting: OBSTETRICS & GYNECOLOGY
Payer: COMMERCIAL

## 2019-06-05 VITALS — RESPIRATION RATE: 16 BRPM | DIASTOLIC BLOOD PRESSURE: 69 MMHG | SYSTOLIC BLOOD PRESSURE: 104 MMHG | TEMPERATURE: 98.2 F

## 2019-06-05 PROBLEM — Z36.89 ENCOUNTER FOR TRIAGE IN PREGNANT PATIENT: Status: ACTIVE | Noted: 2019-06-05

## 2019-06-05 PROCEDURE — 25000132 ZZH RX MED GY IP 250 OP 250 PS 637: Performed by: OBSTETRICS & GYNECOLOGY

## 2019-06-05 PROCEDURE — 25000125 ZZHC RX 250: Performed by: OBSTETRICS & GYNECOLOGY

## 2019-06-05 PROCEDURE — G0463 HOSPITAL OUTPT CLINIC VISIT: HCPCS

## 2019-06-05 RX ORDER — CALCIUM CARBONATE 500 MG/1
1000 TABLET, CHEWABLE ORAL EVERY 4 HOURS PRN
Status: DISCONTINUED | OUTPATIENT
Start: 2019-06-05 | End: 2019-06-06 | Stop reason: HOSPADM

## 2019-06-05 RX ORDER — ACETAMINOPHEN 325 MG/1
325-650 TABLET ORAL EVERY 6 HOURS PRN
COMMUNITY

## 2019-06-05 RX ORDER — ONDANSETRON 4 MG/1
TABLET, FILM COATED ORAL EVERY 8 HOURS PRN
COMMUNITY

## 2019-06-05 RX ADMIN — LIDOCAINE HYDROCHLORIDE 30 ML: 20 SOLUTION ORAL; TOPICAL at 23:38

## 2019-06-05 RX ADMIN — CALCIUM CARBONATE (ANTACID) CHEW TAB 500 MG 1000 MG: 500 CHEW TAB at 22:52

## 2019-06-06 NOTE — PLAN OF CARE
Data: Patient assessed in the [facility] for abdominal pain.  Cervical exam not examined.  Membranes intact.  No Contractions.  Action:  Presumed adequate fetal oxygenation documented (see flow record). Discharge instructions reviewed.  Patient instructed to report change in fetal movement, vaginal leaking of fluid or bleeding, abdominal pain, or any concerns related to the pregnancy to her nurse/physician.    Response: Orders to discharge home per Major Carvalho.  Patient verbalized understanding of education and verbalized agreement with plan. Discharged to home at 2355.

## 2019-06-06 NOTE — PLAN OF CARE
Data: Patient presented to [facility]: 2019  9:59 PM.  Reason for maternal/fetal assessment is abdominal pain. Patient reports epigastric pain, rating pain 8/10, made worse after eating meals.  Patient is a .  Prenatal record reviewed. Pregnancy  has been complicated by  has been complicated by hyperemesis gravidarum.  Gestational Age 24w5d. VSS. Fetal movement present. Patient denies uterine contractions, leaking of vaginal fluid/rupture of membranes, vaginal bleeding, pelvic pressure, visual disturbances. Support person is present.   Action: Verbal consent for EFM. Triage assessment completed. Bill of rights reviewed.  Response: Patient verbalized agreement with plan. Will contact Dr Major Carvalho with update and further orders.

## 2019-06-06 NOTE — PROVIDER NOTIFICATION
06/05/19 2300   Provider Notification   Provider Name/Title Dr. Carvalho    Method of Notification Phone   Request Evaluate - Remote   Notification Reason Patient Arrived;Pain   Epigastric pain 8/10. Blood pressure WNL, reflexes WNL. Denies blurred vision, swelling, and other symptoms of PIH.Patient states she is feeling relief after 1,000 mg of calcium carbonate given from comfort order sets. FHR baseline of 150. No contractions noted on monitor. Denies vaginal discharge. Orders received for GI cocktail and to discharge patient home. POC discussed with patient and support person.

## 2019-06-06 NOTE — DISCHARGE INSTRUCTIONS
Discharge Instruction for Undelivered Patients      You were seen for: abdominal pain  We Consulted: Dr. Carvalho   You had (Test or Medicine):Contractions monitoring, fetal monitoring, calcium carbonate, GI cocktail     Diet:   Drink 8 to 12 glasses of liquids (milk, juice, water) every day.  You may eat meals and snacks.     Activity:  Call your doctor or nurse midwife if your baby is moving less than usual.     Call your provider if you notice:  Swelling in your face or increased swelling in your hands or legs.  Headaches that are not relieved by Tylenol (acetaminophen).  Changes in your vision (blurring: seeing spots or stars.)  Nausea (sick to your stomach) and vomiting (throwing up).   Weight gain of 5 pounds or more per week.  Heartburn that doesn't go away.  Signs of bladder infection: pain when you urinate (use the toilet), need to go more often and more urgently.  The bag of weiss (rupture of membranes) breaks, or you notice leaking in your underwear.  Bright red blood in your underwear.  Abdominal (lower belly) or stomach pain.  For first baby: Contractions (tightening) less than 5 minutes apart for one hour or more.  Second (plus) baby: Contractions (tightening) less than 10 minutes apart and getting stronger.  *If less than 34 weeks: Contractions (tightenings) more than 6 times in one hour.  Increase or change in vaginal discharge (note the color and amount)  Other: Call your doctor with any other questions or concerns     Follow-up:  As scheduled in the clinic

## 2019-06-06 NOTE — PROVIDER NOTIFICATION
06/05/19 9020   Provider Notification   Provider Name/Title Dr. Carvalho   Method of Notification Phone   Request Evaluate - Remote   Patient states she noticed small amount of blood with last emesis this afternoon. No new orders received. Patient has appointment in clinic in AM.

## 2020-03-11 ENCOUNTER — HEALTH MAINTENANCE LETTER (OUTPATIENT)
Age: 25
End: 2020-03-11

## 2021-01-03 ENCOUNTER — HEALTH MAINTENANCE LETTER (OUTPATIENT)
Age: 26
End: 2021-01-03

## 2021-04-25 ENCOUNTER — HEALTH MAINTENANCE LETTER (OUTPATIENT)
Age: 26
End: 2021-04-25

## 2021-10-09 ENCOUNTER — HEALTH MAINTENANCE LETTER (OUTPATIENT)
Age: 26
End: 2021-10-09

## 2022-05-21 ENCOUNTER — HEALTH MAINTENANCE LETTER (OUTPATIENT)
Age: 27
End: 2022-05-21

## 2022-09-17 ENCOUNTER — HEALTH MAINTENANCE LETTER (OUTPATIENT)
Age: 27
End: 2022-09-17

## 2023-06-04 ENCOUNTER — HEALTH MAINTENANCE LETTER (OUTPATIENT)
Age: 28
End: 2023-06-04